# Patient Record
Sex: FEMALE | Race: WHITE | NOT HISPANIC OR LATINO | Employment: OTHER | URBAN - METROPOLITAN AREA
[De-identification: names, ages, dates, MRNs, and addresses within clinical notes are randomized per-mention and may not be internally consistent; named-entity substitution may affect disease eponyms.]

---

## 2017-01-04 ENCOUNTER — OFFICE VISIT (OUTPATIENT)
Dept: FAMILY MEDICINE | Facility: OTHER | Age: 67
End: 2017-01-04
Attending: NURSE PRACTITIONER
Payer: MEDICARE

## 2017-01-04 VITALS
SYSTOLIC BLOOD PRESSURE: 124 MMHG | RESPIRATION RATE: 14 BRPM | WEIGHT: 144 LBS | DIASTOLIC BLOOD PRESSURE: 74 MMHG | TEMPERATURE: 98 F | HEART RATE: 80 BPM | BODY MASS INDEX: 23.14 KG/M2 | HEIGHT: 66 IN

## 2017-01-04 DIAGNOSIS — Z00.00 ROUTINE GENERAL MEDICAL EXAMINATION AT A HEALTH CARE FACILITY: ICD-10-CM

## 2017-01-04 DIAGNOSIS — R10.811 RIGHT UPPER QUADRANT ABDOMINAL TENDERNESS, REBOUND TENDERNESS PRESENCE NOT SPECIFIED: ICD-10-CM

## 2017-01-04 DIAGNOSIS — E78.5 HYPERLIPIDEMIA WITH TARGET LDL LESS THAN 100: Primary | ICD-10-CM

## 2017-01-04 LAB
ALBUMIN SERPL-MCNC: 3.8 G/DL (ref 3.4–5)
ALP SERPL-CCNC: 67 U/L (ref 40–150)
ALT SERPL W P-5'-P-CCNC: 24 U/L (ref 0–50)
AMYLASE SERPL-CCNC: 86 U/L (ref 30–110)
AST SERPL W P-5'-P-CCNC: 18 U/L (ref 0–45)
BILIRUB DIRECT SERPL-MCNC: 0.1 MG/DL (ref 0–0.2)
BILIRUB SERPL-MCNC: 0.7 MG/DL (ref 0.2–1.3)
LIPASE SERPL-CCNC: 227 U/L (ref 73–393)
PROT SERPL-MCNC: 7.6 G/DL (ref 6.8–8.8)
TSH SERPL DL<=0.05 MIU/L-ACNC: 1.06 MU/L (ref 0.4–4)

## 2017-01-04 PROCEDURE — 80076 HEPATIC FUNCTION PANEL: CPT | Performed by: NURSE PRACTITIONER

## 2017-01-04 PROCEDURE — 82150 ASSAY OF AMYLASE: CPT | Performed by: NURSE PRACTITIONER

## 2017-01-04 PROCEDURE — 84443 ASSAY THYROID STIM HORMONE: CPT | Performed by: NURSE PRACTITIONER

## 2017-01-04 PROCEDURE — 83690 ASSAY OF LIPASE: CPT | Performed by: NURSE PRACTITIONER

## 2017-01-04 PROCEDURE — 36415 COLL VENOUS BLD VENIPUNCTURE: CPT | Performed by: NURSE PRACTITIONER

## 2017-01-04 PROCEDURE — 99397 PER PM REEVAL EST PAT 65+ YR: CPT | Performed by: NURSE PRACTITIONER

## 2017-01-04 PROCEDURE — G0202 SCR MAMMO BI INCL CAD: HCPCS | Mod: TC

## 2017-01-04 ASSESSMENT — ANXIETY QUESTIONNAIRES
2. NOT BEING ABLE TO STOP OR CONTROL WORRYING: NOT AT ALL
1. FEELING NERVOUS, ANXIOUS, OR ON EDGE: NOT AT ALL
GAD7 TOTAL SCORE: 0
3. WORRYING TOO MUCH ABOUT DIFFERENT THINGS: NOT AT ALL
6. BECOMING EASILY ANNOYED OR IRRITABLE: NOT AT ALL
IF YOU CHECKED OFF ANY PROBLEMS ON THIS QUESTIONNAIRE, HOW DIFFICULT HAVE THESE PROBLEMS MADE IT FOR YOU TO DO YOUR WORK, TAKE CARE OF THINGS AT HOME, OR GET ALONG WITH OTHER PEOPLE: NOT DIFFICULT AT ALL
7. FEELING AFRAID AS IF SOMETHING AWFUL MIGHT HAPPEN: NOT AT ALL
5. BEING SO RESTLESS THAT IT IS HARD TO SIT STILL: NOT AT ALL

## 2017-01-04 ASSESSMENT — PATIENT HEALTH QUESTIONNAIRE - PHQ9: 5. POOR APPETITE OR OVEREATING: NOT AT ALL

## 2017-01-04 NOTE — MR AVS SNAPSHOT
After Visit Summary   1/4/2017    Therese Medley    MRN: [id number]           Patient Information     Date Of Birth          1950        Visit Information        Provider Department      1/4/2017 9:00 AM Jo-Ann Fitch NP [facility]        Today's Diagnoses     Hyperlipidemia with target LDL less than 100    -  1     Routine general medical examination at a health care facility         Right upper quadrant abdominal tenderness, rebound tenderness presence not specified           Care Instructions        1. Hyperlipidemia with target LDL less than 100  - Low fat diet  - Fish oil 3 per day    2. Routine general medical examination at a health care facility  - TSH    3. Right upper quadrant abdominal tenderness, rebound tenderness presence not specified - with accompanying low back pain  - Amylase  - Lipase  - Hepatic panel      Try aleve as needed  FU if unimproved        Jo-Ann Fitch [facility]  [phone]              Follow-ups after your visit        Your next 10 appointments already scheduled     Jan 04, 2017 11:00 AM   MAMMOGRAM with [facility]   [facility] ([facility] )    [address] [id number] [phone]           Do not wear any body powder, lotions, deodorant or perfume the day of the exam. Bring a list of all medications, especially hormones.  If your last mammogram was not done at [place], please bring your mammogram films. We will need the name of your MD/PA to send a copy of your report.              Who to contact     If you have questions or need follow up information about today's clinic visit or your schedule please contact [facility] directly at [phone].  Normal or non-critical lab and imaging results will be communicated to you by MyChart, letter or phone within 4 business days after the clinic has received the results. If you do not hear from us within 7 days, please contact the clinic through  "[employer] or phone. If you have a critical or abnormal lab result, we will notify you by phone as soon as possible.  Submit refill requests through [employer] or call your pharmacy and they will forward the refill request to us. Please allow 3 business days for your refill to be completed.          Additional Information About Your Visit        [employer] Information     [employer] lets you send messages to your doctor, view your test results, renew your prescriptions, schedule appointments and more. To sign up, go to [email or web address] . Click on \"Log in\" on the left side of the screen, which will take you to the Welcome page. Then click on \"Sign up Now\" on the right side of the page.     You will be asked to enter the access code listed below, as well as some personal information. Please follow the directions to create your username and password.     Your access code is: [email or web address]  Expires: 2017  9:41 AM     Your access code will  in 90 days. If you need help or a new code, please call your [facility] clinic or [phone].        Care EveryWhere ID     This is your Care EveryWhere ID. This could be used by other organizations to access your [facility] medical records  [id number]        Your Vitals Were     Pulse Temperature Respirations Height BMI (Body Mass Index) Breastfeeding?    80 98  F (36.7  C) (Tympanic) 14 5' 6\" (1.676 m) 23.25 kg/m2 No       Blood Pressure from Last 3 Encounters:   17 124/74   11/04/15 120/78   10/21/14 124/80    Weight from Last 3 Encounters:   17 144 lb (65.318 kg)   11/04/15 149 lb (67.586 kg)   10/21/14 140 lb (63.504 kg)              We Performed the Following     Amylase     Hepatic panel     Lipase     TSH        Primary Care Provider Office Phone # Fax #    Jo-Ann Fitch [phone]       [address] [address]        Thank you!     Thank you for choosing [facility] (formerly [facility])  for your care. Our goal is always to " provide you with excellent care. Hearing back from our patients is one way we can continue to improve our services. Please take a few minutes to complete the written survey that you may receive in the mail after your visit with us. Thank you!             Your Updated Medication List - Protect others around you: Learn how to safely use, store and throw away your medicines at www.disposemymeds.org.      Notice  As of 1/4/2017  9:44 AM    You have not been prescribed any medications.

## 2017-01-04 NOTE — NURSING NOTE
"Chief Complaint   Patient presents with     Physical     Patient reports right side pain. Patient is fasting today.       Initial /74 mmHg  Pulse 80  Temp(Src) 98  F (36.7  C) (Tympanic)  Resp 14  Ht 5' 6\" (1.676 m)  Wt 144 lb (65.318 kg)  BMI 23.25 kg/m2  Breastfeeding? No Estimated body mass index is 23.25 kg/(m^2) as calculated from the following:    Height as of this encounter: 5' 6\" (1.676 m).    Weight as of this encounter: 144 lb (65.318 kg).  BP completed using cuff size: raymond Person      "

## 2017-01-04 NOTE — PATIENT INSTRUCTIONS
1. Hyperlipidemia with target LDL less than 100  - Low fat diet  - Fish oil 3 per day    2. Routine general medical examination at a health care facility  - TSH    3. Right upper quadrant abdominal tenderness, rebound tenderness presence not specified - with accompanying low back pain  - Amylase  - Lipase  - Hepatic panel      Try aleve as needed  FU if unimproved        [facility]  [phone]

## 2017-01-04 NOTE — PROGRESS NOTES
CC:  Yearly Well-Woman Exam  Chief Complaint   Patient presents with     Physical     Patient reports right side pain. Patient is fasting today.       HPI:  Therese Medley is a 66 year old female who present today for yearly exam.       Mammogram was today  Last Dexa scan - 2014.    Last colonoscopy - 2016.     BSE monthly  Dental and Eye examinations are UTD    Past Medical History   Diagnosis Date     Lumbago 5/25/2001     Sciatica 6/18/2001     Backache, unspecified 5/24/2001       Past Surgical History   Procedure Laterality Date     Conization of cervix       pre-cancer cells     Hysterectomy/complete, no cervix       large clots     Colonoscopy  2010     every 5 yrs     Gyn surgery       total hyst       No current outpatient prescriptions on file.     No current facility-administered medications for this visit.       Allergies   Allergen Reactions     Codeine Nausea and Vomiting       Family History   Problem Relation Age of Onset     Breast Cancer Sister      Breast Cancer Mother      Hypertension Mother      Thyroid Disease Mother      CANCER Son      hodgkins disease     CANCER Sister      lung     CANCER Father      throat     CEREBROVASCULAR DISEASE Father 73     cause of death     Cancer - colorectal Brother 56     cause of death     Asthma No family hx of      DIABETES No family hx of      Alcohol/Drug Father      Alcohol/Drug Brother      CANCER Mother      Cardiovascular Brother      Cardiovascular Brother      CANCER Brother      Cardiovascular Brother      Cardiovascular Brother        Social History     Social History     Marital Status:      Spouse Name: N/A     Number of Children: N/A     Years of Education: N/A     Social History Main Topics     Smoking status: Never Smoker      Smokeless tobacco: Never Used     Alcohol Use: Yes      Comment: occasionally     Drug Use: No     Sexual Activity:     Partners: Male     Other Topics Concern     Blood Transfusions Yes     Caffeine  "Concern Yes     coffee - 3 cups daily     Exercise Yes     walking 3-4 times/week     Parent/Sibling W/ Cabg, Mi Or Angioplasty Before 65f 55m? No     Social History Narrative       REVIEW OF SYSTEMS  Skin: negative  Eyes: negative  Ears/Nose/Throat: negative  Respiratory: No concerns  Breast:  Self examinations normal  Cardiovascular: negative  Gastrointestinal: negative  Genitourinary: negative  Musculoskeletal: Right side - muscular soreness, from low back wrapping around to upper abdomen  Neurologic: negative  Psychiatric: negative  Hematologic/Lymphatic/Immunologic: negative  Endocrine: negative      OBJECTIVE:  /74 mmHg  Pulse 80  Temp(Src) 98  F (36.7  C) (Tympanic)  Resp 14  Ht 5' 6\" (1.676 m)  Wt 144 lb (65.318 kg)  BMI 23.25 kg/m2  Breastfeeding? No  Constitutional: healthy, alert, no distress and cooperative  Head: Normocephalic. No masses, lesions, tenderness or abnormalities  Neck: Neck supple. No adenopathy. Thyroid symmetric, normal size,, Carotids without bruits.  ENT: ENT exam normal, no neck nodes or sinus tenderness  Cardiovascular: negative, PMI normal. No lifts, heaves, or thrills. RRR. No murmurs, clicks gallops or rub  Respiratory: negative, Percussion normal. Good diaphragmatic excursion. Lungs clear  Breast:  Examination normal  Gastrointestinal: Pain from back radiates to RUQ  Musculoskeletal: right sided low back - mid scapular line - tender muscles, anterior just under bottom rib, tender.    Skin: no suspicious lesions or rashes  Neurologic: Gait normal. Reflexes normal and symmetric. Sensation grossly WNL.  Psychiatric: mentation appears normal and affect normal/bright      1. Hyperlipidemia with target LDL less than 100  - Low fat diet  - Fish oil 3 per day    2. Routine general medical examination at a health care facility  - TSH    3. Right upper quadrant abdominal tenderness, rebound tenderness presence not specified - with accompanying low back pain  - Amylase  - " Lipase  - Hepatic panel      Try aleve as needed  FU if unimproved        [facility]  [phone]

## 2017-01-05 ASSESSMENT — ANXIETY QUESTIONNAIRES: GAD7 TOTAL SCORE: 0

## 2017-01-05 ASSESSMENT — PATIENT HEALTH QUESTIONNAIRE - PHQ9: SUM OF ALL RESPONSES TO PHQ QUESTIONS 1-9: 0

## 2018-01-05 ENCOUNTER — OFFICE VISIT (OUTPATIENT)
Dept: FAMILY MEDICINE | Facility: OTHER | Age: 68
End: 2018-01-05
Attending: NURSE PRACTITIONER
Payer: COMMERCIAL

## 2018-01-05 VITALS
SYSTOLIC BLOOD PRESSURE: 118 MMHG | DIASTOLIC BLOOD PRESSURE: 76 MMHG | RESPIRATION RATE: 14 BRPM | WEIGHT: 147 LBS | HEIGHT: 65 IN | HEART RATE: 80 BPM | BODY MASS INDEX: 24.49 KG/M2

## 2018-01-05 DIAGNOSIS — Z23 NEED FOR PROPHYLACTIC VACCINATION AND INOCULATION AGAINST COMBINATIONS OF DISEASE: ICD-10-CM

## 2018-01-05 DIAGNOSIS — Z11.59 NEED FOR HEPATITIS C SCREENING TEST: ICD-10-CM

## 2018-01-05 DIAGNOSIS — Z00.00 ROUTINE GENERAL MEDICAL EXAMINATION AT A HEALTH CARE FACILITY: Primary | ICD-10-CM

## 2018-01-05 DIAGNOSIS — E78.5 HYPERLIPIDEMIA LDL GOAL <100: ICD-10-CM

## 2018-01-05 LAB
ALBUMIN UR-MCNC: NEGATIVE MG/DL
ANION GAP SERPL CALCULATED.3IONS-SCNC: 6 MMOL/L (ref 3–14)
APPEARANCE UR: CLEAR
BILIRUB UR QL STRIP: NEGATIVE
BUN SERPL-MCNC: 18 MG/DL (ref 7–30)
CALCIUM SERPL-MCNC: 9 MG/DL (ref 8.5–10.1)
CHLORIDE SERPL-SCNC: 104 MMOL/L (ref 94–109)
CHOLEST SERPL-MCNC: 255 MG/DL
CO2 SERPL-SCNC: 29 MMOL/L (ref 20–32)
COLOR UR AUTO: YELLOW
CREAT SERPL-MCNC: 0.87 MG/DL (ref 0.52–1.04)
GFR SERPL CREATININE-BSD FRML MDRD: 65 ML/MIN/1.7M2
GLUCOSE SERPL-MCNC: 83 MG/DL (ref 70–99)
GLUCOSE UR STRIP-MCNC: NEGATIVE MG/DL
HDLC SERPL-MCNC: 70 MG/DL
HGB UR QL STRIP: ABNORMAL
KETONES UR STRIP-MCNC: NEGATIVE MG/DL
LDLC SERPL CALC-MCNC: 156 MG/DL
LEUKOCYTE ESTERASE UR QL STRIP: NEGATIVE
NITRATE UR QL: NEGATIVE
NONHDLC SERPL-MCNC: 185 MG/DL
PH UR STRIP: 5.5 PH (ref 5–7)
POTASSIUM SERPL-SCNC: 4.2 MMOL/L (ref 3.4–5.3)
RBC #/AREA URNS AUTO: NORMAL /HPF
SODIUM SERPL-SCNC: 139 MMOL/L (ref 133–144)
SOURCE: ABNORMAL
SP GR UR STRIP: 1.02 (ref 1–1.03)
TRIGL SERPL-MCNC: 143 MG/DL
UROBILINOGEN UR STRIP-ACNC: 0.2 EU/DL (ref 0.2–1)
WBC #/AREA URNS AUTO: NORMAL /HPF

## 2018-01-05 PROCEDURE — 99397 PER PM REEVAL EST PAT 65+ YR: CPT | Performed by: NURSE PRACTITIONER

## 2018-01-05 PROCEDURE — 99000 SPECIMEN HANDLING OFFICE-LAB: CPT | Performed by: NURSE PRACTITIONER

## 2018-01-05 PROCEDURE — 80048 BASIC METABOLIC PNL TOTAL CA: CPT | Mod: ZL | Performed by: NURSE PRACTITIONER

## 2018-01-05 PROCEDURE — 86803 HEPATITIS C AB TEST: CPT | Mod: ZL | Performed by: NURSE PRACTITIONER

## 2018-01-05 PROCEDURE — 81001 URINALYSIS AUTO W/SCOPE: CPT | Mod: ZL | Performed by: NURSE PRACTITIONER

## 2018-01-05 PROCEDURE — 90670 PCV13 VACCINE IM: CPT | Performed by: NURSE PRACTITIONER

## 2018-01-05 PROCEDURE — G0463 HOSPITAL OUTPT CLINIC VISIT: HCPCS

## 2018-01-05 PROCEDURE — 36415 COLL VENOUS BLD VENIPUNCTURE: CPT | Mod: ZL | Performed by: NURSE PRACTITIONER

## 2018-01-05 PROCEDURE — 80061 LIPID PANEL: CPT | Mod: ZL | Performed by: NURSE PRACTITIONER

## 2018-01-05 PROCEDURE — 90471 IMMUNIZATION ADMIN: CPT | Performed by: NURSE PRACTITIONER

## 2018-01-05 ASSESSMENT — ANXIETY QUESTIONNAIRES
GAD7 TOTAL SCORE: 0
4. TROUBLE RELAXING: NOT AT ALL
5. BEING SO RESTLESS THAT IT IS HARD TO SIT STILL: NOT AT ALL
IF YOU CHECKED OFF ANY PROBLEMS ON THIS QUESTIONNAIRE, HOW DIFFICULT HAVE THESE PROBLEMS MADE IT FOR YOU TO DO YOUR WORK, TAKE CARE OF THINGS AT HOME, OR GET ALONG WITH OTHER PEOPLE: NOT DIFFICULT AT ALL
2. NOT BEING ABLE TO STOP OR CONTROL WORRYING: NOT AT ALL
7. FEELING AFRAID AS IF SOMETHING AWFUL MIGHT HAPPEN: NOT AT ALL
6. BECOMING EASILY ANNOYED OR IRRITABLE: NOT AT ALL
1. FEELING NERVOUS, ANXIOUS, OR ON EDGE: NOT AT ALL
3. WORRYING TOO MUCH ABOUT DIFFERENT THINGS: NOT AT ALL

## 2018-01-05 ASSESSMENT — PATIENT HEALTH QUESTIONNAIRE - PHQ9: SUM OF ALL RESPONSES TO PHQ QUESTIONS 1-9: 0

## 2018-01-05 NOTE — NURSING NOTE
"Estimated body mass index is 24.46 kg/(m^2) as calculated from the following:    Height as of this encounter: 5' 5\" (1.651 m).    Weight as of this encounter: 147 lb (66.7 kg).  BP Readings from Last 1 Encounters:   01/05/18 118/80   ]  BP cuff size:  regular  Do you feel safe in your environment?  Yes  Does the patient need any medication refills today? No    Magaly Gusman      "

## 2018-01-05 NOTE — PROGRESS NOTES
SUBJECTIVE:   CC: Therese Medley is an 67 year old woman who presents for preventive health visit.         Healthy Habits:    Do you get at least three servings of calcium containing foods daily (dairy, green leafy vegetables, etc.)? yes    Amount of exercise or daily activities, outside of work: 4 days a week, 45-60min daily     Problems taking medications regularly No    Medication side effects: No    Have you had an eye exam in the past two years? yes    Do you see a dentist twice per year? yes    Do you have sleep apnea, excessive snoring or daytime drowsiness?no        Right thumb  1 week  Grating cheese and scraped thumb      PHQ-9 SCORE 11/4/2015 1/4/2017 1/5/2018   Total Score - - -   Total Score 0 0 0     MELANY-7 SCORE 1/4/2017 1/5/2018   Total Score 0 0         Abuse: Current or Past(Physical, Sexual or Emotional)- No  Do you feel safe in your environment - Yes    Social History   Substance Use Topics     Smoking status: Never Smoker     Smokeless tobacco: Never Used     Alcohol use Yes      Comment: occasionally     If you drink alcohol do you typically have >3 drinks per day or >7 drinks per week? No                     Reviewed orders with patient.  Reviewed health maintenance and updated orders accordingly - Yes  Labs reviewed in EPIC  BP Readings from Last 3 Encounters:   01/05/18 118/76   01/04/17 124/74   11/04/15 120/78    Wt Readings from Last 3 Encounters:   01/05/18 147 lb (66.7 kg)   01/04/17 144 lb (65.3 kg)   11/04/15 149 lb (67.6 kg)                  Patient Active Problem List   Diagnosis     Advanced care planning/counseling discussion     Hyperlipidemia with target LDL less than 100     Past Surgical History:   Procedure Laterality Date     COLONOSCOPY  2010    every 5 yrs     conization of cervix      pre-cancer cells     GYN SURGERY      total hyst     hysterectomy/complete, no cervix      large clots       Social History   Substance Use Topics     Smoking status: Never Smoker      Smokeless tobacco: Never Used     Alcohol use Yes      Comment: occasionally     Family History   Problem Relation Age of Onset     CANCER Father      throat     CEREBROVASCULAR DISEASE Father 73     cause of death     Alcohol/Drug Father      Breast Cancer Sister      Breast Cancer Mother      Hypertension Mother      Thyroid Disease Mother      CANCER Mother      CANCER Son      hodgkins disease     CANCER Sister      lung     Cancer - colorectal Brother 56     cause of death     Alcohol/Drug Brother      Cardiovascular Brother      Cardiovascular Brother      CANCER Brother      Cardiovascular Brother      Cardiovascular Brother      Asthma No family hx of      DIABETES No family hx of          Current Outpatient Prescriptions   Medication Sig Dispense Refill     aspirin 81 MG tablet Take by mouth daily       Allergies   Allergen Reactions     Codeine Nausea and Vomiting     Recent Labs   Lab Test  01/04/17   0946  11/04/15   0954  10/21/14   1119   LDL   --   133*  136*   HDL   --   61  67   TRIG   --   111  163*   ALT  24   --    --    TSH  1.06   --   1.38          Mammo is scheduled for next week    History of abnormal Pap smear:  No    Reviewed and updated as needed this visit by clinical staffTobacco  Allergies  Meds  Med Hx  Surg Hx  Fam Hx  Soc Hx        Reviewed and updated as needed this visit by Provider  Tobacco        Past Medical History:   Diagnosis Date     Backache, unspecified 5/24/2001     Lumbago 5/25/2001     Sciatica 6/18/2001      Past Surgical History:   Procedure Laterality Date     COLONOSCOPY  2010    every 5 yrs     conization of cervix      pre-cancer cells     GYN SURGERY      total hyst     hysterectomy/complete, no cervix      large clots       ROS:  C: NEGATIVE for fever, chills, change in weight  INTEGUMENTARY/SKIN: right thumb abrasion  E: NEGATIVE for vision changes or irritation  ENT: NEGATIVE for ear, mouth and throat problems  R: NEGATIVE for significant cough or  "SOB  B: NEGATIVE for masses, tenderness or discharge  CV: NEGATIVE for chest pain, palpitations or peripheral edema  GI: NEGATIVE for nausea, abdominal pain, heartburn, or change in bowel habits  : NEGATIVE for unusual urinary or vaginal symptoms. No vaginal bleeding.  N: NEGATIVE for weakness, dizziness or paresthesias  P: NEGATIVE for changes in mood or affect     OBJECTIVE:   /76 (BP Location: Left arm, Patient Position: Sitting, Cuff Size: Adult Regular)  Pulse 80  Resp 14  Ht 5' 5\" (1.651 m)  Wt 147 lb (66.7 kg)  BMI 24.46 kg/m2       EXAM:  GENERAL: healthy, alert and no distress  EYES: Eyes grossly normal to inspection, PERRL and conjunctivae and sclerae normal  HENT: ear canals and TM's normal, nose and mouth without ulcers or lesions  NECK: no adenopathy, no asymmetry, masses, or scars and thyroid normal to palpation  RESP: lungs clear to auscultation - no rales, rhonchi or wheezes  BREAST: normal without masses, tenderness or nipple discharge and no palpable axillary masses or adenopathy  CV: regular rate and rhythm, normal S1 S2, no S3 or S4, no murmur, click or rub, no peripheral edema and peripheral pulses strong  ABDOMEN: soft, nontender, no hepatosplenomegaly, no masses and bowel sounds normal  MS: no gross musculoskeletal defects noted, no edema  SKIN: healing abrasion right thumb, IP joint, non- concerning  PSYCH: mentation appears normal, affect normal/bright      ASSESSMENT/PLAN:   1. Routine general medical examination at a health care facility  - [facility]    2. Hyperlipidemia LDL goal <100  - Low fat diet  - Fish oil 3 per day  - Lipid Profile  - Basic metabolic panel    3. Need for hepatitis C screening test  - Hepatitis C Screen Reflex to HCV RNA Quant and Genotype    4. Need for prophylactic vaccination and inoculation against combinations of disease  - PNEUMOCOCCAL CONJ VACCINE 13 VALENT IM  - ADMIN 1st VACCINE        COUNSELING: " "  Reviewed preventive health counseling, as reflected in patient instructions       Regular exercise       Healthy diet/nutrition       Vision screening         reports that she has never smoked. She has never used smokeless tobacco.    Estimated body mass index is 24.46 kg/(m^2) as calculated from the following:    Height as of this encounter: 5' 5\" (1.651 m).    Weight as of this encounter: 147 lb (66.7 kg).         Counseling Resources:  ATP IV Guidelines  Pooled Cohorts Equation Calculator  Breast Cancer Risk Calculator  FRAX Risk Assessment  ICSI Preventive Guidelines  Dietary Guidelines for Americans, 2010  USDA's MyPlate  ASA Prophylaxis  Lung CA Screening    Jo-Ann Fitch NP  [facility]  "

## 2018-01-05 NOTE — PATIENT INSTRUCTIONS
ASSESSMENT/PLAN:   1. Routine general medical examination at a health care facility  - UA reflex to Microscopic and Culture - [facility]/[place]    2. Hyperlipidemia LDL goal <100  - Low fat diet  - Fish oil 3 per day  - Lipid Profile  - Basic metabolic panel    3. Need for hepatitis C screening test  - Hepatitis C Screen Reflex to HCV RNA Quant and Genotype    4. Need for prophylactic vaccination and inoculation against combinations of disease  - PNEUMOCOCCAL CONJ VACCINE 13 VALENT IM  - ADMIN 1st VACCINE      Preventive Health Recommendations  Female Ages 65 +    Yearly exam:     See your health care provider every year in order to  o Review health changes.   o Discuss preventive care.    o Review your medicines if your doctor has prescribed any.      You no longer need a yearly Pap test unless you've had an abnormal Pap test in the past 10 years. If you have vaginal symptoms, such as bleeding or discharge, be sure to talk with your provider about a Pap test.      Every 1 to 2 years, have a mammogram.  If you are over 69, talk with your health care provider about whether or not you want to continue having screening mammograms.      Every 10 years, have a colonoscopy. Or, have a yearly FIT test (stool test). These exams will check for colon cancer.       Have a cholesterol test every 5 years, or more often if your doctor advises it.       Have a diabetes test (fasting glucose) every three years. If you are at risk for diabetes, you should have this test more often.       At age 65, have a bone density scan (DEXA) to check for osteoporosis (brittle bone disease).    Shots:    Get a flu shot each year.    Get a tetanus shot every 10 years.    Talk to your doctor about your pneumonia vaccines. There are now two you should receive - Pneumovax (PPSV 23) and Prevnar (PCV 13).    Talk to your doctor about the shingles vaccine.    Talk to your doctor about the hepatitis B vaccine.    Nutrition:     Eat at least 5 servings  of fruits and vegetables each day.      Eat whole-grain bread, whole-wheat pasta and brown rice instead of white grains and rice.      Talk to your provider about Calcium and Vitamin D.     Lifestyle    Exercise at least 150 minutes a week (30 minutes a day, 5 days a week). This will help you control your weight and prevent disease.      Limit alcohol to one drink per day.      No smoking.       Wear sunscreen to prevent skin cancer.       See your dentist twice a year for an exam and cleaning.      See your eye doctor every 1 to 2 years to screen for conditions such as glaucoma, macular degeneration, cataracts, etc

## 2018-01-05 NOTE — MR AVS SNAPSHOT
After Visit Summary   1/5/2018    Therese Medley    MRN: [id number]           Patient Information     Date Of Birth          1950        Visit Information        Provider Department      1/5/2018 9:00 AM Jo-Ann Fitch NP [facility]        Today's Diagnoses     Routine general medical examination at a health care facility    -  1    Hyperlipidemia LDL goal <100        Need for hepatitis C screening test        Need for prophylactic vaccination and inoculation against combinations of disease          Care Instructions      ASSESSMENT/PLAN:   1. Routine general medical examination at a health care facility  - UA reflex to Microscopic and Culture - [facility]    2. Hyperlipidemia LDL goal <100  - Low fat diet  - Fish oil 3 per day  - Lipid Profile  - Basic metabolic panel    3. Need for hepatitis C screening test  - Hepatitis C Screen Reflex to HCV RNA Quant and Genotype    4. Need for prophylactic vaccination and inoculation against combinations of disease  - PNEUMOCOCCAL CONJ VACCINE 13 VALENT IM  - ADMIN 1st VACCINE      Preventive Health Recommendations  Female Ages 65 +    Yearly exam:     See your health care provider every year in order to  o Review health changes.   o Discuss preventive care.    o Review your medicines if your doctor has prescribed any.      You no longer need a yearly Pap test unless you've had an abnormal Pap test in the past 10 years. If you have vaginal symptoms, such as bleeding or discharge, be sure to talk with your provider about a Pap test.      Every 1 to 2 years, have a mammogram.  If you are over 69, talk with your health care provider about whether or not you want to continue having screening mammograms.      Every 10 years, have a colonoscopy. Or, have a yearly FIT test (stool test). These exams will check for colon cancer.       Have a cholesterol test every 5 years, or more often if your doctor advises it.       Have a diabetes test  (fasting glucose) every three years. If you are at risk for diabetes, you should have this test more often.       At age 65, have a bone density scan (DEXA) to check for osteoporosis (brittle bone disease).    Shots:    Get a flu shot each year.    Get a tetanus shot every 10 years.    Talk to your doctor about your pneumonia vaccines. There are now two you should receive - Pneumovax (PPSV 23) and Prevnar (PCV 13).    Talk to your doctor about the shingles vaccine.    Talk to your doctor about the hepatitis B vaccine.    Nutrition:     Eat at least 5 servings of fruits and vegetables each day.      Eat whole-grain bread, whole-wheat pasta and brown rice instead of white grains and rice.      Talk to your provider about Calcium and Vitamin D.     Lifestyle    Exercise at least 150 minutes a week (30 minutes a day, 5 days a week). This will help you control your weight and prevent disease.      Limit alcohol to one drink per day.      No smoking.       Wear sunscreen to prevent skin cancer.       See your dentist twice a year for an exam and cleaning.      See your eye doctor every 1 to 2 years to screen for conditions such as glaucoma, macular degeneration, cataracts, etc           Follow-ups after your visit        Your next 10 appointments already scheduled     Jan 17, 2018  9:30 AM CST   (Arrive by 9:15 AM)   MA SCREENING DIGITAL BILATERAL with MTMA1   [facility] ([facility] )    [address] [address]   [phone]           Do not use any powder, lotion or deodorant under your arms or on your breast. If you do, we will ask you to remove it before your exam.  Wear comfortable, two-piece clothing.  If you have any allergies, tell your care team.  Bring any previous mammograms from other facilities or have them mailed to the breast center.              Who to contact     If you have questions or need follow up information about today's clinic  "visit or your schedule please contact [facility] directly at [phone].  Normal or non-critical lab and imaging results will be communicated to you by MyChart, letter or phone within 4 business days after the clinic has received the results. If you do not hear from us within 7 days, please contact the clinic through broadbandchoiceshart or phone. If you have a critical or abnormal lab result, we will notify you by phone as soon as possible.  Submit refill requests through [employer] or call your pharmacy and they will forward the refill request to us. Please allow 3 business days for your refill to be completed.          Additional Information About Your Visit        MyChart Information     [employer] lets you send messages to your doctor, view your test results, renew your prescriptions, schedule appointments and more. To sign up, go to [email or web address] . Click on \"Log in\" on the left side of the screen, which will take you to the Welcome page. Then click on \"Sign up Now\" on the right side of the page.     You will be asked to enter the access code listed below, as well as some personal information. Please follow the directions to create your username and password.     Your access code is: [email or web address]  Expires: 2018 10:02 AM     Your access code will  in 90 days. If you need help or a new code, please call your [facility] clinic or [phone].        Care EveryWhere ID     This is your Care EveryWhere ID. This could be used by other organizations to access your [facility] medical records  [id number]        Your Vitals Were     Pulse Respirations Height BMI (Body Mass Index)          80 14 5' 5\" (1.651 m) 24.46 kg/m2         Blood Pressure from Last 3 Encounters:   18 118/76   17 124/74   11/04/15 120/78    Weight from Last 3 Encounters:   18 147 lb (66.7 kg)   17 144 lb (65.3 kg)   11/04/15 149 lb (67.6 kg)              We Performed the Following     *UA reflex to Microscopic " and Culture - [facility]/[place]     ADMIN 1st VACCINE     Basic metabolic panel     Hepatitis C Screen Reflex to HCV RNA Quant and Genotype     Lipid Profile     PNEUMOCOCCAL CONJ VACCINE 13 VALENT IM        Primary Care Provider Office Phone # Fax #    Jo-Ann Fitch [phone] [address]  [place] [address]        Equal Access to Services     CARLOS ELIZONDO : Hadii aad ku hadasho Soomaali, waaxda luqadaha, qaybta kaalmada adeegyada, waxay idiin hayaan adeeg kharash laNaeemaan . So [facility] [phone].    ATENCIÓN: Si habla español, tiene a lopez disposición servicios gratuitos de asistencia lingüística. Llame al [phone].    We comply with applicable federal civil rights laws and [place] laws. We do not discriminate on the basis of race, color, national origin, age, disability, sex, sexual orientation, or gender identity.            Thank you!     Thank you for choosing [facility]  for your care. Our goal is always to provide you with excellent care. Hearing back from our patients is one way we can continue to improve our services. Please take a few minutes to complete the written survey that you may receive in the mail after your visit with us. Thank you!             Your Updated Medication List - Protect others around you: Learn how to safely use, store and throw away your medicines at www.disposemymeds.org.          This list is accurate as of: 1/5/18 10:02 AM.  Always use your most recent med list.                   Brand Name Dispense Instructions for use Diagnosis    aspirin 81 MG tablet      Take by mouth daily

## 2018-01-06 ENCOUNTER — HEALTH MAINTENANCE LETTER (OUTPATIENT)
Age: 68
End: 2018-01-06

## 2018-01-06 ASSESSMENT — ANXIETY QUESTIONNAIRES: GAD7 TOTAL SCORE: 0

## 2018-01-08 LAB — HCV AB SERPL QL IA: NONREACTIVE

## 2018-01-17 ENCOUNTER — RADIANT APPOINTMENT (OUTPATIENT)
Dept: MAMMOGRAPHY | Facility: OTHER | Age: 68
End: 2018-01-17
Attending: NURSE PRACTITIONER
Payer: MEDICARE

## 2018-01-17 DIAGNOSIS — Z12.31 VISIT FOR SCREENING MAMMOGRAM: ICD-10-CM

## 2018-01-17 PROCEDURE — 77067 SCR MAMMO BI INCL CAD: CPT | Mod: TC

## 2018-02-26 ENCOUNTER — OFFICE VISIT (OUTPATIENT)
Dept: FAMILY MEDICINE | Facility: OTHER | Age: 68
End: 2018-02-26
Attending: NURSE PRACTITIONER
Payer: MEDICARE

## 2018-02-26 VITALS
RESPIRATION RATE: 14 BRPM | WEIGHT: 149 LBS | HEART RATE: 89 BPM | SYSTOLIC BLOOD PRESSURE: 118 MMHG | BODY MASS INDEX: 23.95 KG/M2 | DIASTOLIC BLOOD PRESSURE: 72 MMHG | OXYGEN SATURATION: 96 % | TEMPERATURE: 99.9 F | HEIGHT: 66 IN

## 2018-02-26 DIAGNOSIS — R05.9 COUGH: Primary | ICD-10-CM

## 2018-02-26 DIAGNOSIS — J10.1 INFLUENZA B: ICD-10-CM

## 2018-02-26 LAB
FLUAV+FLUBV AG SPEC QL: NEGATIVE
FLUAV+FLUBV AG SPEC QL: POSITIVE
SPECIMEN SOURCE: ABNORMAL

## 2018-02-26 PROCEDURE — G0463 HOSPITAL OUTPT CLINIC VISIT: HCPCS

## 2018-02-26 PROCEDURE — 87804 INFLUENZA ASSAY W/OPTIC: CPT | Mod: ZL,59 | Performed by: NURSE PRACTITIONER

## 2018-02-26 PROCEDURE — 99213 OFFICE O/P EST LOW 20 MIN: CPT | Performed by: NURSE PRACTITIONER

## 2018-02-26 RX ORDER — OSELTAMIVIR PHOSPHATE 75 MG/1
75 CAPSULE ORAL DAILY
Qty: 10 CAPSULE | Refills: 0 | Status: SHIPPED | OUTPATIENT
Start: 2018-02-26 | End: 2018-03-08

## 2018-02-26 ASSESSMENT — ANXIETY QUESTIONNAIRES
3. WORRYING TOO MUCH ABOUT DIFFERENT THINGS: NOT AT ALL
GAD7 TOTAL SCORE: 0
4. TROUBLE RELAXING: NOT AT ALL
1. FEELING NERVOUS, ANXIOUS, OR ON EDGE: NOT AT ALL
2. NOT BEING ABLE TO STOP OR CONTROL WORRYING: NOT AT ALL
6. BECOMING EASILY ANNOYED OR IRRITABLE: NOT AT ALL
5. BEING SO RESTLESS THAT IT IS HARD TO SIT STILL: NOT AT ALL
7. FEELING AFRAID AS IF SOMETHING AWFUL MIGHT HAPPEN: NOT AT ALL

## 2018-02-26 ASSESSMENT — PAIN SCALES - GENERAL: PAINLEVEL: NO PAIN (0)

## 2018-02-26 NOTE — NURSING NOTE
"Chief Complaint   Patient presents with     URI       Initial /72 (BP Location: Left arm, Patient Position: Chair, Cuff Size: Adult Regular)  Pulse 89  Temp 99.9  F (37.7  C) (Tympanic)  Resp 14  Ht 5' 5.5\" (1.664 m)  Wt 149 lb (67.6 kg)  SpO2 96%  BMI 24.42 kg/m2 Estimated body mass index is 24.42 kg/(m^2) as calculated from the following:    Height as of this encounter: 5' 5.5\" (1.664 m).    Weight as of this encounter: 149 lb (67.6 kg).  Medication Reconciliation: complete     Eileen Augustine     "

## 2018-02-26 NOTE — PROGRESS NOTES
SUBJECTIVE:   Therese Medley is a 67 year old female who presents to clinic today for the following health issues:        Acute Illness   Acute illness concerns: chest cold  Onset: 5 days    Fever: YES, low grade    Chills/Sweats: YES    Headache (location?): no     Sinus Pressure:no    Conjunctivitis:  no    Ear Pain: no    Rhinorrhea: no    Congestion: YES    Sore Throat: no      Cough: YES-productive of clear sputum    Wheeze: no     Decreased Appetite: not really    Nausea: no     Vomiting: no     Diarrhea:  no     Dysuria/Freq.: no, some increased frequency    Fatigue/Achiness: YES    Sick/Strep Exposure: YES     Therapies Tried and outcome: robitussin, tylenol head and cold          Problem list and histories reviewed & adjusted, as indicated.  Additional history: as documented    Patient Active Problem List   Diagnosis     Advanced care planning/counseling discussion     Past Surgical History:   Procedure Laterality Date     COLONOSCOPY  2010    every 5 yrs     conization of cervix      pre-cancer cells     GYN SURGERY      total hyst     hysterectomy/complete, no cervix      large clots       Social History   Substance Use Topics     Smoking status: Never Smoker     Smokeless tobacco: Never Used     Alcohol use Yes      Comment: occasionally     Family History   Problem Relation Age of Onset     CANCER Father      throat     CEREBROVASCULAR DISEASE Father 73     cause of death     Alcohol/Drug Father      Breast Cancer Sister      Breast Cancer Mother      Hypertension Mother      Thyroid Disease Mother      CANCER Mother      CANCER Son      hodgkins disease     CANCER Sister      lung     Cancer - colorectal Brother 56     cause of death     Alcohol/Drug Brother      Cardiovascular Brother      Cardiovascular Brother      CANCER Brother      Cardiovascular Brother      Cardiovascular Brother      Asthma No family hx of      DIABETES No family hx of          Current Outpatient Prescriptions  "  Medication Sig Dispense Refill     aspirin 81 MG tablet Take by mouth daily       Allergies   Allergen Reactions     Codeine Nausea and Vomiting     Recent Labs   Lab Test  01/05/18   1008  01/04/17   0946  11/04/15   0954  10/21/14   1119   LDL  156*   --   133*  136*   HDL  70   --   61  67   TRIG  143   --   111  163*   ALT   --   24   --    --    CR  0.87   --    --    --    GFRESTIMATED  65   --    --    --    GFRESTBLACK  78   --    --    --    POTASSIUM  4.2   --    --    --    TSH   --   1.06   --   1.38      BP Readings from Last 3 Encounters:   02/26/18 118/72   01/05/18 118/76   01/04/17 124/74    Wt Readings from Last 3 Encounters:   02/26/18 149 lb (67.6 kg)   01/05/18 147 lb (66.7 kg)   01/04/17 144 lb (65.3 kg)                  Labs reviewed in EPIC    Reviewed and updated as needed this visit by clinical staff       Reviewed and updated as needed this visit by Provider         ROS:  Constitutional, HEENT, cardiovascular, pulmonary, gi and gu systems are negative, except as otherwise noted.    OBJECTIVE:     /72 (BP Location: Left arm, Patient Position: Chair, Cuff Size: Adult Regular)  Pulse 89  Temp 99.9  F (37.7  C) (Tympanic)  Resp 14  Ht 5' 5.5\" (1.664 m)  Wt 149 lb (67.6 kg)  SpO2 96%  BMI 24.42 kg/m2  Body mass index is 24.42 kg/(m^2).       GENERAL: healthy, alert and no distress  HENT: ear canals and TM's normal, nose and mouth without ulcers or lesions  NECK: no adenopathy, no asymmetry, masses, or scars and thyroid normal to palpation  RESP: lungs clear to auscultation - no rales, rhonchi or wheezes  CV: regular rate and rhythm, normal S1 S2, no S3 or S4, no murmur, click or rub, no peripheral edema and peripheral pulses strong  SKIN: no suspicious lesions or rashes  PSYCH: mentation appears normal, affect normal/bright      Diagnostic Test Results:  Results for orders placed or performed in visit on 02/26/18 (from the past 24 hour(s))   Influenza A/B antigen   Result Value " Ref Range    Influenza A/B Agn Specimen Nasal     Influenza A Negative NEG^Negative    Influenza B Positive (A) NEG^Negative       ASSESSMENT/PLAN:     1. Influenza B  - oseltamivir (TAMIFLU) 75 MG capsule; Take 1 capsule (75 mg) by mouth daily for 10 days  Dispense: 10 capsule; Refill: 0    2. Cough  - OTC cough and cold          Fluids  Rest  Humidity at home - add bacteriostatic solution to humidifier  OTC Mucinex liquid cough and cold  Add Zicam or other zinc daily until you feel better  Please go to the ER or UC if your symptoms worsen   Please return to clinic if unimproved        Jo-Ann Fitch NP  [facility]

## 2018-02-26 NOTE — PATIENT INSTRUCTIONS
Results for orders placed or performed in visit on 02/26/18 (from the past 24 hour(s))   Influenza A/B antigen   Result Value Ref Range    Influenza A/B Agn Specimen Nasal     Influenza A Negative NEG^Negative    Influenza B Positive (A) NEG^Negative         ASSESSMENT/PLAN:     1. Influenza B  - oseltamivir (TAMIFLU) 75 MG capsule; Take 1 capsule (75 mg) by mouth daily for 10 days  Dispense: 10 capsule; Refill: 0    2. Cough  - OTC cough and cold          Fluids  Rest  Humidity at home - add bacteriostatic solution to humidifier  OTC Mucinex liquid cough and cold  Add Zicam or other zinc daily until you feel better  Please go to the ER or UC if your symptoms worsen   Please return to clinic if unimproved        Jo-Ann Fitch NP  [facility]

## 2018-02-26 NOTE — MR AVS SNAPSHOT
After Visit Summary   2/26/2018    Therese Medley    MRN: [id number]           Patient Information     Date Of Birth          1950        Visit Information        Provider Department      2/26/2018 2:30 PM Jo-Ann Fitch NP [facility]        Today's Diagnoses     Cough    -  1    Influenza B          Care Instructions    Results for orders placed or performed in visit on 02/26/18 (from the past 24 hour(s))   Influenza A/B antigen   Result Value Ref Range    Influenza A/B Agn Specimen Nasal     Influenza A Negative NEG^Negative    Influenza B Positive (A) NEG^Negative         ASSESSMENT/PLAN:     1. Influenza B  - oseltamivir (TAMIFLU) 75 MG capsule; Take 1 capsule (75 mg) by mouth daily for 10 days  Dispense: 10 capsule; Refill: 0    2. Cough  - OTC cough and cold          Fluids  Rest  Humidity at home - add bacteriostatic solution to humidifier  OTC Mucinex liquid cough and cold  Add Zicam or other zinc daily until you feel better  Please go to the ER or UC if your symptoms worsen   Please return to clinic if unimproved        Jo-Ann Fitch NP  [facility]            Follow-ups after your visit        Who to contact     If you have questions or need follow up information about today's clinic visit or your schedule please contact [facility] directly at [phone].  Normal or non-critical lab and imaging results will be communicated to you by MyChart, letter or phone within 4 business days after the clinic has received the results. If you do not hear from us within 7 days, please contact the clinic through MyChart or phone. If you have a critical or abnormal lab result, we will notify you by phone as soon as possible.  Submit refill requests through [employer] or call your pharmacy and they will forward the refill request to us. Please allow 3 business days for your refill to be completed.          Additional Information About Your Visit        "[employer]  "Information     [employer] lets you send messages to your doctor, view your test results, renew your prescriptions, schedule appointments and more. To sign up, go to [email or web address] . Click on \"Log in\" on the left side of the screen, which will take you to the Welcome page. Then click on \"Sign up Now\" on the right side of the page.     You will be asked to enter the access code listed below, as well as some personal information. Please follow the directions to create your username and password.     Your access code is: [email or web address]  Expires: 2018 10:02 AM     Your access code will  in 90 days. If you need help or a new code, please call your [facility] clinic or [phone].        Care EveryWhere ID     This is your Care EveryWhere ID. This could be used by other organizations to access your [facility] medical records  [id number]        Your Vitals Were     Pulse Temperature Respirations Height Pulse Oximetry BMI (Body Mass Index)    89 99.9  F (37.7  C) (Tympanic) 14 5' 5.5\" (1.664 m) 96% 24.42 kg/m2       Blood Pressure from Last 3 Encounters:   18 118/72   18 118/76   17 124/74    Weight from Last 3 Encounters:   18 149 lb (67.6 kg)   18 147 lb (66.7 kg)   17 144 lb (65.3 kg)              We Performed the Following     Influenza A/B antigen          Today's Medication Changes          These changes are accurate as of 18  3:03 PM.  If you have any questions, ask your nurse or doctor.               Start taking these medicines.        Dose/Directions    oseltamivir 75 MG capsule   Commonly known as:  TAMIFLU   Used for:  Influenza B   Started by:  Jo-Ann Fitch NP        Dose:  75 mg   Take 1 capsule (75 mg) by mouth daily for 10 days   Quantity:  10 capsule   Refills:  0            Where to get your medicines      These medications were sent to [facility] - [place], [place] - [address]  [address] [address]     Phone:  [phone]     oseltamivir 75 " MG capsule                Primary Care Provider Office Phone # Fax #    Jo-Ann Fitch [phone] [address]  [place] [address]        Equal Access to Services     CARLOS ELIZONDO : Hadii shayne easley hadmiladyso Soomaali, waaxda luqadaha, qaybta kaalmada adeegyada, elsa fabioin hayaaabby ramirezarden cunningham bhupendra cobb. So [facility] [phone].    ATENCIÓN: Si habla español, tiene a lopez disposición servicios gratuitos de asistencia lingüística. Llame al [phone].    We comply with applicable federal civil rights laws and [place] laws. We do not discriminate on the basis of race, color, national origin, age, disability, sex, sexual orientation, or gender identity.            Thank you!     Thank you for choosing [facility]  for your care. Our goal is always to provide you with excellent care. Hearing back from our patients is one way we can continue to improve our services. Please take a few minutes to complete the written survey that you may receive in the mail after your visit with us. Thank you!             Your Updated Medication List - Protect others around you: Learn how to safely use, store and throw away your medicines at www.disposemymeds.org.          This list is accurate as of 2/26/18  3:03 PM.  Always use your most recent med list.                   Brand Name Dispense Instructions for use Diagnosis    aspirin 81 MG tablet      Take by mouth daily        oseltamivir 75 MG capsule    TAMIFLU    10 capsule    Take 1 capsule (75 mg) by mouth daily for 10 days    Influenza B

## 2018-02-27 ASSESSMENT — PATIENT HEALTH QUESTIONNAIRE - PHQ9: SUM OF ALL RESPONSES TO PHQ QUESTIONS 1-9: 1

## 2018-02-27 ASSESSMENT — ANXIETY QUESTIONNAIRES: GAD7 TOTAL SCORE: 0

## 2019-01-17 NOTE — PROGRESS NOTES
"SUBJECTIVE:         Physical Health:    In general, how would you rate your overall physical health? good    Outside of work, how many days during the week do you exercise? 6-7 days/week    Outside of work, approximately how many minutes a day do you exercise?30-45 minutes    If you drink alcohol do you typically have >3 drinks per day or >7 drinks per week  3-4 weekly    Do you usually eat at least 4 servings of fruit and vegetables a day, include whole grains & fiber and avoid regularly eating high fat or \"junk\" foods? Yes    Do you have any problems taking medications regularly?  No    Do you have any side effects from medications? not applicable    Needs assistance for the following daily activities: no assistance needed    Which of the following safety concerns are present in your home?  none identified     Hearing impairment: Yes, family thinks she does    In the past 6 months, have you been bothered by leaking of urine? no        Mental Health:    In general, how would you rate your overall mental or emotional health? excellent  PHQ-2 Score:        PHQ-9 SCORE 1/5/2018 2/26/2018 1/22/2019   PHQ-9 Total Score - - -   PHQ-9 Total Score 0 1 0     MELANY-7 SCORE 1/5/2018 2/26/2018 1/22/2019   Total Score 0 0 0       Do you feel safe in your environment? Yes    Do you have a Health Care Directive? Yes: Patient states has Advance Directive and will bring in a copy to clinic.      Fall risk:  Fallen 2 or more times in the past year?: No        Do you have sleep apnea, excessive snoring or daytime drowsiness?: no        Reviewed and updated as needed this visit by clinical staff  Tobacco  Allergies  Meds  Med Hx  Surg Hx  Fam Hx  Soc Hx        Reviewed and updated as needed this visit by Provider        Social History     Tobacco Use     Smoking status: Never Smoker     Smokeless tobacco: Never Used   Substance Use Topics     Alcohol use: Yes     Comment: occasionally                           Current providers " sharing in care for this patient include:   Patient Care Team:  Jo-Ann Fitch NP as PCP - General (Family Practice)    The following health maintenance items are reviewed in Epic and correct as of today:  Health Maintenance   Topic Date Due     MAMMO Q1 YR  01/17/2019     FALL RISK ASSESSMENT  02/26/2019     MELANY QUESTIONNAIRE 1 YEAR  02/26/2019     PHQ-9 Q1YR  02/26/2019     DTAP/TDAP/TD IMMUNIZATION (2 - Td) 09/05/2022     LIPID SCREENING Q5 YEARS  01/05/2023     ADVANCE DIRECTIVE PLANNING Q5 YRS  01/05/2023     COLON CANCER SCREEN (SYSTEM ASSIGNED)  04/08/2026     INFLUENZA VACCINE  Completed     ZOSTER IMMUNIZATION  Completed     HEPATITIS C SCREENING  Completed     DEXA SCAN SCREENING (SYSTEM ASSIGNED)  Addressed     IPV IMMUNIZATION  Aged Out     MENINGITIS IMMUNIZATION  Aged Out     Labs reviewed in EPIC  BP Readings from Last 3 Encounters:   01/22/19 110/62   02/26/18 118/72   01/05/18 118/76    Wt Readings from Last 3 Encounters:   01/22/19 66.7 kg (147 lb)   02/26/18 67.6 kg (149 lb)   01/05/18 66.7 kg (147 lb)                  Patient Active Problem List   Diagnosis     Advanced care planning/counseling discussion     Past Surgical History:   Procedure Laterality Date     COLONOSCOPY  2010    every 5 yrs     conization of cervix      pre-cancer cells     GYN SURGERY      total hyst     hysterectomy/complete, no cervix      large clots       Social History     Tobacco Use     Smoking status: Never Smoker     Smokeless tobacco: Never Used   Substance Use Topics     Alcohol use: Yes     Comment: occasionally     Family History   Problem Relation Age of Onset     Cancer Father         throat     Cerebrovascular Disease Father 73        cause of death     Alcohol/Drug Father      Breast Cancer Sister      Breast Cancer Mother      Hypertension Mother      Thyroid Disease Mother      Cancer Mother      Cancer Son         hodgkins disease     Cancer Sister         lung     Cancer - colorectal Brother 56         "cause of death     Alcohol/Drug Brother      Cardiovascular Brother      Cardiovascular Brother      Cancer Brother      Cardiovascular Brother      Cardiovascular Brother      Asthma No family hx of      Diabetes No family hx of          No current outpatient medications on file.     Allergies   Allergen Reactions     Codeine Nausea and Vomiting     Recent Labs   Lab Test 01/05/18  1008 01/04/17  0946 11/04/15  0954 10/21/14  1119   *  --  133* 136*   HDL 70  --  61 67   TRIG 143  --  111 163*   ALT  --  24  --   --    CR 0.87  --   --   --    GFRESTIMATED 65  --   --   --    GFRESTBLACK 78  --   --   --    POTASSIUM 4.2  --   --   --    TSH  --  1.06  --  1.38          ROS:  Constitutional, HEENT, cardiovascular, pulmonary, GI, , musculoskeletal, neuro, skin, endocrine and psych systems are negative, except as otherwise noted.      OBJECTIVE:   /62 (BP Location: Right arm, Patient Position: Sitting, Cuff Size: Adult Regular)   Pulse 60   Resp 14   Ht 1.664 m (5' 5.5\")   Wt 66.7 kg (147 lb)   BMI 24.09 kg/m   Estimated body mass index is 24.09 kg/m  as calculated from the following:    Height as of this encounter: 1.664 m (5' 5.5\").    Weight as of this encounter: 66.7 kg (147 lb).     EXAM:   GENERAL: healthy, alert and no distress  EYES: Eyes grossly normal to inspection, PERRL and conjunctivae and sclerae normal  HENT: ear canals and TM's normal, nose and mouth without ulcers or lesions  NECK: no adenopathy, no asymmetry, masses, or scars and thyroid normal to palpation  RESP: lungs clear to auscultation - no rales, rhonchi or wheezes  CV: regular rate and rhythm, normal S1 S2, no S3 or S4, no murmur, click or rub, no peripheral edema and peripheral pulses strong  ABDOMEN: soft, nontender, no hepatosplenomegaly, no masses and bowel sounds normal  MS: no gross musculoskeletal defects noted, no edema  SKIN: no suspicious lesions or rashes  PSYCH: mentation appears normal, affect " "normal/bright      Mammogram is scheduled      ASSESSMENT / PLAN:   1. Annual physical exam  - Annual physical 1 year    2. Conductive hearing loss, bilateral  - AUDIOLOGY ADULT REFERRAL        COUNSELING:  Reviewed preventive health counseling, as reflected in patient instructions       Regular exercise       Healthy diet/nutrition    BP Readings from Last 1 Encounters:   01/22/19 110/62     Estimated body mass index is 24.09 kg/m  as calculated from the following:    Height as of this encounter: 1.664 m (5' 5.5\").    Weight as of this encounter: 66.7 kg (147 lb).           reports that  has never smoked. she has never used smokeless tobacco.      Appropriate preventive services were discussed with this patient, including applicable screening as appropriate for cardiovascular disease, diabetes, osteopenia/osteoporosis, and glaucoma.  As appropriate for age/gender, discussed screening for colorectal cancer, prostate cancer, breast cancer, and cervical cancer. Checklist reviewing preventive services available has been given to the patient.    Reviewed patients plan of care and provided an AVS.     Counseling Resources:  ATP IV Guidelines  Pooled Cohorts Equation Calculator  Breast Cancer Risk Calculator  FRAX Risk Assessment  ICSI Preventive Guidelines  Dietary Guidelines for Americans, 2010  USDA's MyPlate  ASA Prophylaxis  Lung CA Screening    Jo-Ann Fitch NP  [facility] [place] [facility]  "

## 2019-01-17 NOTE — PATIENT INSTRUCTIONS
Preventive Health Recommendations    See your health care provider every year to    Review health changes.     Discuss preventive care.      Review your medicines if your doctor has prescribed any.      You no longer need a yearly Pap test unless you've had an abnormal Pap test in the past 10 years. If you have vaginal symptoms, such as bleeding or discharge, be sure to talk with your provider about a Pap test.      Every 1 to 2 years, have a mammogram.  If you are over 69, talk with your health care provider about whether or not you want to continue having screening mammograms.      Every 10 years, have a colonoscopy. Or, have a yearly FIT test (stool test). These exams will check for colon cancer.       Have a cholesterol test every 5 years, or more often if your doctor advises it.       Have a diabetes test (fasting glucose) every three years. If you are at risk for diabetes, you should have this test more often.       At age 65, have a bone density scan (DEXA) to check for osteoporosis (brittle bone disease).    Shots:    Get a flu shot each year.    Get a tetanus shot every 10 years.    Talk to your doctor about your pneumonia vaccines. There are now two you should receive - Pneumovax (PPSV 23) and Prevnar (PCV 13).    Talk to your pharmacist about the shingles vaccine.    Talk to your doctor about the hepatitis B vaccine.    Nutrition:     Eat at least 5 servings of fruits and vegetables each day.      Eat whole-grain bread, whole-wheat pasta and brown rice instead of white grains and rice.      Get adequate about Calcium and Vitamin D.     Lifestyle    Exercise at least 150 minutes a week (30 minutes a day, 5 days a week). This will help you control your weight and prevent disease.      Limit alcohol to one drink per day.      No smoking.       Wear sunscreen to prevent skin cancer.       See your dentist twice a year for an exam and cleaning.      See your eye doctor every 1 to 2 years to screen for  conditions such as glaucoma, macular degeneration, cataracts, etc.    Personalized Prevention Plan  You are due for the preventive services outlined below.  Your care team is available to assist you in scheduling these services.  If you have already completed any of these items, please share that information with your care team to update in your medical record.    Health Maintenance Due   Topic Date Due     Flu Vaccine (1) 09/01/2018     Cholesterol Lab - yearly  01/05/2019     Pneumococcal Vaccine (2 of 2 - PPSV23) 01/05/2019     Mammogram - yearly  01/17/2019

## 2019-01-22 ENCOUNTER — OFFICE VISIT (OUTPATIENT)
Dept: FAMILY MEDICINE | Facility: OTHER | Age: 69
End: 2019-01-22
Attending: NURSE PRACTITIONER
Payer: COMMERCIAL

## 2019-01-22 VITALS
SYSTOLIC BLOOD PRESSURE: 110 MMHG | HEART RATE: 60 BPM | BODY MASS INDEX: 23.63 KG/M2 | RESPIRATION RATE: 14 BRPM | DIASTOLIC BLOOD PRESSURE: 62 MMHG | WEIGHT: 147 LBS | HEIGHT: 66 IN

## 2019-01-22 DIAGNOSIS — Z00.00 ANNUAL PHYSICAL EXAM: Primary | ICD-10-CM

## 2019-01-22 DIAGNOSIS — H90.0 CONDUCTIVE HEARING LOSS, BILATERAL: ICD-10-CM

## 2019-01-22 PROCEDURE — 99397 PER PM REEVAL EST PAT 65+ YR: CPT | Performed by: NURSE PRACTITIONER

## 2019-01-22 ASSESSMENT — ANXIETY QUESTIONNAIRES
1. FEELING NERVOUS, ANXIOUS, OR ON EDGE: NOT AT ALL
6. BECOMING EASILY ANNOYED OR IRRITABLE: NOT AT ALL
3. WORRYING TOO MUCH ABOUT DIFFERENT THINGS: NOT AT ALL
5. BEING SO RESTLESS THAT IT IS HARD TO SIT STILL: NOT AT ALL
4. TROUBLE RELAXING: NOT AT ALL
GAD7 TOTAL SCORE: 0
7. FEELING AFRAID AS IF SOMETHING AWFUL MIGHT HAPPEN: NOT AT ALL
IF YOU CHECKED OFF ANY PROBLEMS ON THIS QUESTIONNAIRE, HOW DIFFICULT HAVE THESE PROBLEMS MADE IT FOR YOU TO DO YOUR WORK, TAKE CARE OF THINGS AT HOME, OR GET ALONG WITH OTHER PEOPLE: NOT DIFFICULT AT ALL
2. NOT BEING ABLE TO STOP OR CONTROL WORRYING: NOT AT ALL

## 2019-01-22 ASSESSMENT — MIFFLIN-ST. JEOR: SCORE: 1195.6

## 2019-01-22 ASSESSMENT — PAIN SCALES - GENERAL: PAINLEVEL: NO PAIN (0)

## 2019-01-22 ASSESSMENT — PATIENT HEALTH QUESTIONNAIRE - PHQ9: SUM OF ALL RESPONSES TO PHQ QUESTIONS 1-9: 0

## 2019-01-22 NOTE — NURSING NOTE
"Chief Complaint   Patient presents with     Physical       Initial /62 (BP Location: Right arm, Patient Position: Sitting, Cuff Size: Adult Regular)   Pulse 60   Resp 14   Ht 1.664 m (5' 5.5\")   Wt 66.7 kg (147 lb)   BMI 24.09 kg/m   Estimated body mass index is 24.09 kg/m  as calculated from the following:    Height as of this encounter: 1.664 m (5' 5.5\").    Weight as of this encounter: 66.7 kg (147 lb).  Medication Reconciliation: complete    Magaly Gusman LPN    "

## 2019-01-23 ASSESSMENT — ANXIETY QUESTIONNAIRES: GAD7 TOTAL SCORE: 0

## 2019-01-29 ENCOUNTER — ANCILLARY PROCEDURE (OUTPATIENT)
Dept: MAMMOGRAPHY | Facility: OTHER | Age: 69
End: 2019-01-29
Attending: NURSE PRACTITIONER
Payer: MEDICARE

## 2019-01-29 DIAGNOSIS — Z12.39 SCREENING BREAST EXAMINATION: ICD-10-CM

## 2019-01-29 PROCEDURE — 77063 BREAST TOMOSYNTHESIS BI: CPT | Mod: TC

## 2019-03-14 ENCOUNTER — OFFICE VISIT (OUTPATIENT)
Dept: AUDIOLOGY | Facility: OTHER | Age: 69
End: 2019-03-14
Attending: NURSE PRACTITIONER
Payer: MEDICARE

## 2019-03-14 DIAGNOSIS — H90.3 SENSORINEURAL HEARING LOSS, ASYMMETRICAL: Primary | ICD-10-CM

## 2019-03-14 PROCEDURE — 92557 COMPREHENSIVE HEARING TEST: CPT | Performed by: AUDIOLOGIST

## 2019-03-14 PROCEDURE — 92550 TYMPANOMETRY & REFLEX THRESH: CPT | Performed by: AUDIOLOGIST

## 2019-03-14 NOTE — PROGRESS NOTES
Audiology Evaluation Completed. Please refer SCANNED AUDIOGRAM and/or TYMPANOGRAM for BACKGROUND, RESULTS, RECOMMENDATIONS.      Radha THOMAS, [facility]-A  Audiologist #[id number]

## 2019-03-19 ENCOUNTER — OFFICE VISIT (OUTPATIENT)
Dept: OTOLARYNGOLOGY | Facility: OTHER | Age: 69
End: 2019-03-19
Attending: NURSE PRACTITIONER
Payer: COMMERCIAL

## 2019-03-19 VITALS
TEMPERATURE: 98.1 F | OXYGEN SATURATION: 96 % | WEIGHT: 147 LBS | HEIGHT: 66 IN | SYSTOLIC BLOOD PRESSURE: 112 MMHG | HEART RATE: 77 BPM | DIASTOLIC BLOOD PRESSURE: 64 MMHG | BODY MASS INDEX: 23.63 KG/M2

## 2019-03-19 DIAGNOSIS — H93.19 TINNITUS, UNSPECIFIED LATERALITY: Primary | ICD-10-CM

## 2019-03-19 DIAGNOSIS — H90.3 SENSORINEURAL HEARING LOSS, ASYMMETRICAL: ICD-10-CM

## 2019-03-19 PROCEDURE — 92504 EAR MICROSCOPY EXAMINATION: CPT | Performed by: NURSE PRACTITIONER

## 2019-03-19 PROCEDURE — G0463 HOSPITAL OUTPT CLINIC VISIT: HCPCS | Mod: 25

## 2019-03-19 PROCEDURE — 99213 OFFICE O/P EST LOW 20 MIN: CPT | Mod: 25 | Performed by: NURSE PRACTITIONER

## 2019-03-19 PROCEDURE — G0463 HOSPITAL OUTPT CLINIC VISIT: HCPCS

## 2019-03-19 ASSESSMENT — PAIN SCALES - GENERAL: PAINLEVEL: NO PAIN (0)

## 2019-03-19 ASSESSMENT — MIFFLIN-ST. JEOR: SCORE: 1195.6

## 2019-03-19 NOTE — NURSING NOTE
"Chief Complaint   Patient presents with     Hearing Problem     ASNHL       Initial /64   Pulse 77   Temp 98.1  F (36.7  C) (Tympanic)   Ht 1.664 m (5' 5.5\")   Wt 66.7 kg (147 lb)   SpO2 96%   BMI 24.09 kg/m   Estimated body mass index is 24.09 kg/m  as calculated from the following:    Height as of this encounter: 1.664 m (5' 5.5\").    Weight as of this encounter: 66.7 kg (147 lb).  Medication Reconciliation: complete    Magaly Avalos LPN  "

## 2019-03-19 NOTE — PATIENT INSTRUCTIONS
Complete audiogram in 6 months.  Hearing Aid clearance was given today.  Follow up with ENT as needed.    Thank you for allowing Janell Prescott CNP and our ENT team to participate in your care.  If your medications are too expensive, please give the nurse a call.  We can possibly change this medication.  If you have a scheduling or an appointment question please contact [facility] Unit Coordinator at their direct line [phone]  ALL nursing questions or concerns can be directed to your ENT nurse at: [phone] - [place]

## 2019-03-19 NOTE — PROGRESS NOTES
"Otolaryngology Note         Chief Complaint:     Patient presents with:  Hearing Problem: ASNHL         History of Present Illness:     Therese Medley is here today for hearing aide clearance, however, she needs to look at insurance coverage. Hearing loss has been gradual over the last couple years with no acute changes. Feels at times hear rodney is better in the morning compared to evenings.  No fluctuating hearing loss.  No otalgia, otorrhea or aura fullness.  Vertigo: denies  Tinnitus: mild with aspirin  There is no facial weakness, facial numbness or dysphagia.  No COM, otologic surgeries or trauma.  Noise exposure: vacuums   Family history of adult onset of hearing loss.     Audiogram  3/14/19  Tympanograms Type A bilaterally  Right ear thresholds normal sloping to severe SNHL, SRT 15 dBHL, WRS 88%  Left ear thresholds normal sloping to severe ASNHL, SRT 20 dBHL, WRS 88%   SRT = PTA  Acoustic reflex thresholds: Absent ipsi left/contra right and present ipsi right/contra left         Medications:     No current outpatient medications on file.            Allergies:     Allergies: Codeine          Past Medical History:     Past Medical History:   Diagnosis Date     Backache, unspecified 5/24/2001     Lumbago 5/25/2001     Sciatica 6/18/2001            Past Surgical History:     Past Surgical History:   Procedure Laterality Date     COLONOSCOPY  2010    every 5 yrs     conization of cervix      pre-cancer cells     GYN SURGERY      total hyst     hysterectomy/complete, no cervix      large clots       ENT family history reviewed         Social History:     Social History     Tobacco Use     Smoking status: Never Smoker     Smokeless tobacco: Never Used   Substance Use Topics     Alcohol use: Yes     Comment: occasionally     Drug use: No            Review of Systems:     ROS: See HPI         Physical Exam:     /64   Pulse 77   Temp 98.1  F (36.7  C) (Tympanic)   Ht 1.664 m (5' 5.5\")   Wt 66.7 kg " (147 lb)   SpO2 96%   BMI 24.09 kg/m    General - The patient is well nourished and well developed, and appears to have good nutritional status.  Alert and oriented to person and place, answers questions and cooperates with examination appropriately.   Head and Face - Normocephalic and atraumatic, with no gross asymmetry noted.  The facial nerve is intact, with strong symmetric movements.  Voice and Breathing - The patient was breathing comfortably without the use of accessory muscles. There was no wheezing, stridor. The patients voice was clear and strong, and had appropriate pitch and quality.  Ears - External ear normal. The ears were examined under microscopy bilaterally.  The right and left external auditory canals are patent, the tympanic membranes are intact without effusion or worrisome retractions.  Normal bony landmarks are appreciated.   Eyes - Extraocular movements intact, and the pupils were reactive to light. Sclera were not icteric or injected, conjunctiva were pink and moist.  Mouth - Examination of the oral cavity showed pink, healthy oral mucosa. Dentition in good condition. No lesions or ulcerations noted. The tongue was mobile and midline.   Throat - The walls of the oropharynx were smooth, pink, moist, symmetric, and had no lesions or ulcerations.  The tonsillar pillars and soft palate were symmetric. The uvula was midline on elevation.    Neck - Normal midline excursion of the laryngotracheal complex during swallowing.  Full range of motion on passive movement.  Palpation of the occipital, submental, submandibular, internal jugular chain, and supraclavicular nodes did not demonstrate any abnormal lymph nodes or masses.  Palpation of the thyroid was soft and smooth, with no nodules or goiter appreciated.  The trachea was mobile and midline.  Nose - External contour is symmetric, no gross deflection or scars.  Nasal mucosa is pink and moist with no abnormal mucus.  The septum and turbinates  were evaluated: mild right anterior DNS.  No polyps, masses, or purulence noted on examination.         Assessment and Plan:       ICD-10-CM    1. Tinnitus, unspecified laterality H93.19    2. Sensorineural hearing loss, asymmetrical H90.5      The current leading theory on tinnitus is that it originates from the central nervous system itself, and is usually associated with hearing loss and injury to cochlear hair cells.  Also discussed were steps that can be taken to mask the noise, such as a low volume de-tuned radio, a fan in the background, and hearing aids. Correlation with stress, anxiety, depression, and high blood pressure were also discussed. The patient was also cautioned on the numerous expensive non-pharmaceutical options that are advertised, and have no proven benefit.    Educated that aspirin use can cause tinnitus.    The possibility of acoustic neuroma causing asymmetrical nerve hearing loss was discussed .I discussed the indication for MRI of the Brain and Internal Auditory Canals.   The patient was told acoustic neuromas are very rare, benign, and generally treated by observation only. Recommended proceeding with MRI of the IAC to rule out an acoustic neuroma.     Patient defers audiogram at this time. A follow-up audiogram is recommended in 6 months.  This should be sooner if a patient develops vertigo, new or asymmetric hearing loss or unresolved unilateral tinnitus.  If these symptoms were to develop, an MRI would need to be obtained.    At this time, will provide medical clearance for hearing aides.    Encouraged her to follow-up sooner as needed.     Janell Prescott NP  ENT  [facility], [place]  [phone]

## 2019-03-19 NOTE — LETTER
3/19/2019         RE: Therese Medley  [address] [place] [address]        Dear Colleague,    Thank you for referring your patient, Therese Medley, to the [facility]. Please see a copy of my visit note below.    Otolaryngology Note         Chief Complaint:     Patient presents with:  Hearing Problem: ASNHL         History of Present Illness:     Therese Medley is here today for hearing aide clearance, however, she needs to look at insurance coverage. Hearing loss has been gradual over the last couple years with no acute changes. Feels at times hear rodney is better in the morning compared to evenings.  No fluctuating hearing loss.  No otalgia, otorrhea or aura fullness.  Vertigo: denies  Tinnitus: mild with aspirin  There is no facial weakness, facial numbness or dysphagia.  No COM, otologic surgeries or trauma.  Noise exposure: vacuums   Family history of adult onset of hearing loss.     Audiogram  3/14/19  Tympanograms Type A bilaterally  Right ear thresholds normal sloping to severe SNHL, SRT 15 dBHL, WRS 88%  Left ear thresholds normal sloping to severe ASNHL, SRT 20 dBHL, WRS 88%   SRT = PTA  Acoustic reflex thresholds: Absent ipsi left/contra right and present ipsi right/contra left         Medications:     No current outpatient medications on file.            Allergies:     Allergies: Codeine          Past Medical History:     Past Medical History:   Diagnosis Date     Backache, unspecified 5/24/2001     Lumbago 5/25/2001     Sciatica 6/18/2001            Past Surgical History:     Past Surgical History:   Procedure Laterality Date     COLONOSCOPY  2010    every 5 yrs     conization of cervix      pre-cancer cells     GYN SURGERY      total hyst     hysterectomy/complete, no cervix      large clots       ENT family history reviewed         Social History:     Social History     Tobacco Use     Smoking status: Never Smoker     Smokeless tobacco: Never Used  "  Substance Use Topics     Alcohol use: Yes     Comment: occasionally     Drug use: No            Review of Systems:     ROS: See HPI         Physical Exam:     /64   Pulse 77   Temp 98.1  F (36.7  C) (Tympanic)   Ht 1.664 m (5' 5.5\")   Wt 66.7 kg (147 lb)   SpO2 96%   BMI 24.09 kg/m     General - The patient is well nourished and well developed, and appears to have good nutritional status.  Alert and oriented to person and place, answers questions and cooperates with examination appropriately.   Head and Face - Normocephalic and atraumatic, with no gross asymmetry noted.  The facial nerve is intact, with strong symmetric movements.  Voice and Breathing - The patient was breathing comfortably without the use of accessory muscles. There was no wheezing, stridor. The patients voice was clear and strong, and had appropriate pitch and quality.  Ears - External ear normal. The ears were examined under microscopy bilaterally.  The right and left external auditory canals are patent, the tympanic membranes are intact without effusion or worrisome retractions.  Normal bony landmarks are appreciated.   Eyes - Extraocular movements intact, and the pupils were reactive to light. Sclera were not icteric or injected, conjunctiva were pink and moist.  Mouth - Examination of the oral cavity showed pink, healthy oral mucosa. Dentition in good condition. No lesions or ulcerations noted. The tongue was mobile and midline.   Throat - The walls of the oropharynx were smooth, pink, moist, symmetric, and had no lesions or ulcerations.  The tonsillar pillars and soft palate were symmetric. The uvula was midline on elevation.    Neck - Normal midline excursion of the laryngotracheal complex during swallowing.  Full range of motion on passive movement.  Palpation of the occipital, submental, submandibular, internal jugular chain, and supraclavicular nodes did not demonstrate any abnormal lymph nodes or masses.  Palpation of the " thyroid was soft and smooth, with no nodules or goiter appreciated.  The trachea was mobile and midline.  Nose - External contour is symmetric, no gross deflection or scars.  Nasal mucosa is pink and moist with no abnormal mucus.  The septum and turbinates were evaluated: mild right anterior DNS.  No polyps, masses, or purulence noted on examination.         Assessment and Plan:       ICD-10-CM    1. Tinnitus, unspecified laterality H93.19    2. Sensorineural hearing loss, asymmetrical H90.5      The current leading theory on tinnitus is that it originates from the central nervous system itself, and is usually associated with hearing loss and injury to cochlear hair cells.  Also discussed were steps that can be taken to mask the noise, such as a low volume de-tuned radio, a fan in the background, and hearing aids. Correlation with stress, anxiety, depression, and high blood pressure were also discussed. The patient was also cautioned on the numerous expensive non-pharmaceutical options that are advertised, and have no proven benefit.    Educated that aspirin use can cause tinnitus.    The possibility of acoustic neuroma causing asymmetrical nerve hearing loss was discussed .I discussed the indication for MRI of the Brain and Internal Auditory Canals.   The patient was told acoustic neuromas are very rare, benign, and generally treated by observation only. Recommended proceeding with MRI of the IAC to rule out an acoustic neuroma.     Patient defers audiogram at this time. A follow-up audiogram is recommended in 6 months.  This should be sooner if a patient develops vertigo, new or asymmetric hearing loss or unresolved unilateral tinnitus.  If these symptoms were to develop, an MRI would need to be obtained.    At this time, will provide medical clearance for hearing aides.    Encouraged her to follow-up sooner as needed.     Janell Prescott NP  ENT  [facility], [place]  [phone]      Again, thank you for  allowing me to participate in the care of your patient.        Sincerely,        JOHANA Esteban CNP

## 2019-10-05 ENCOUNTER — HEALTH MAINTENANCE LETTER (OUTPATIENT)
Age: 69
End: 2019-10-05

## 2020-01-22 NOTE — PATIENT INSTRUCTIONS
"ASSESSMENT / PLAN:   1. Routine general medical examination at a health care facility  - Lipid Profile  - UA reflex to Microscopic and Culture - [place] [facility]  - CBC with platelets differential    2. Other skin changes  - DERMATOLOGY REFERRAL        COUNSELING:  Reviewed preventive health counseling, as reflected in patient instructions       Regular exercise       Healthy diet/nutrition       Vision screening       Hearing screening    Estimated body mass index is 23.93 kg/m  as calculated from the following:    Height as of 3/19/19: 1.664 m (5' 5.5\").    Weight as of this encounter: 66.2 kg (146 lb).          Preventive Health Recommendations    See your health care provider every year to    Review health changes.     Discuss preventive care.      Review your medicines if your doctor has prescribed any.      You no longer need a yearly Pap test unless you've had an abnormal Pap test in the past 10 years. If you have vaginal symptoms, such as bleeding or discharge, be sure to talk with your provider about a Pap test.      Every 1 to 2 years, have a mammogram.  If you are over 69, talk with your health care provider about whether or not you want to continue having screening mammograms.      Every 10 years, have a colonoscopy. Or, have a yearly FIT test (stool test). These exams will check for colon cancer.       Have a cholesterol test every 5 years, or more often if your doctor advises it.       Have a diabetes test (fasting glucose) every three years. If you are at risk for diabetes, you should have this test more often.       At age 65, have a bone density scan (DEXA) to check for osteoporosis (brittle bone disease).    Shots:    Get a flu shot each year.    Get a tetanus shot every 10 years.    Talk to your doctor about your pneumonia vaccines. There are now two you should receive - Pneumovax (PPSV 23) and Prevnar (PCV 13).    Talk to your pharmacist about the shingles vaccine.    Talk to your doctor about " the hepatitis B vaccine.    Nutrition:     Eat at least 5 servings of fruits and vegetables each day.      Eat whole-grain bread, whole-wheat pasta and brown rice instead of white grains and rice.      Get adequate about Calcium and Vitamin D.     Lifestyle    Exercise at least 150 minutes a week (30 minutes a day, 5 days a week). This will help you control your weight and prevent disease.      Limit alcohol to one drink per day.      No smoking.       Wear sunscreen to prevent skin cancer.       See your dentist twice a year for an exam and cleaning.      See your eye doctor every 1 to 2 years to screen for conditions such as glaucoma, macular degeneration, cataracts, etc.    Personalized Prevention Plan  You are due for the preventive services outlined below.  Your care team is available to assist you in scheduling these services.  If you have already completed any of these items, please share that information with your care team to update in your medical record.    Health Maintenance Due   Topic Date Due     Pneumococcal Vaccine (2 of 2 - PPSV23) 01/05/2019     PHQ-2  01/01/2020     Annual Wellness Visit  01/22/2020     Anxiety Assessment  01/22/2020     Depression Assessment  01/22/2020     Mammogram  01/29/2020

## 2020-01-22 NOTE — PROGRESS NOTES
SUBJECTIVE:   Therese Medley is a 69 year old female who presents for Preventive Visit.      Do you feel safe in your environment? Yes    Have you ever done Advance Care Planning? (For example, a Health Directive, POLST, or a discussion with a medical provider or your loved ones about your wishes): Yes, advance care planning is on file.      Fall risk  Fall Risk Assessment not completed.      PHQ-9 SCORE 2/26/2018 1/22/2019 1/27/2020   PHQ-9 Total Score - - -   PHQ-9 Total Score 1 0 0     MELANY-7 SCORE 2/26/2018 1/22/2019 1/27/2020   Total Score 0 0 0           Mini-CogTM Copyright S Malinda. Licensed by the author for use in [facility]; reprinted with permission ([email or web address]). All rights reserved.          Do you have sleep apnea, excessive snoring or daytime drowsiness?: no    Reviewed and updated as needed this visit by clinical staff  Tobacco  Allergies  Meds  Med Hx  Surg Hx  Fam Hx  Soc Hx        Reviewed and updated as needed this visit by Provider  Tobacco        Social History     Tobacco Use     Smoking status: Never Smoker     Smokeless tobacco: Never Used   Substance Use Topics     Alcohol use: Yes     Comment: occasionally     If you drink alcohol do you typically have >3 drinks per day or >7 drinks per week? Yes      Alcohol Use 1/27/2020   Prescreen: >3 drinks/day or >7 drinks/week? Yes   No flowsheet data found.      Current providers sharing in care for this patient include:   Patient Care Team:  Jo-Ann Fitch CNP as PCP - General (Family Practice)  Jo-Ann Fitch CNP as Assigned PCP         The following health maintenance items are reviewed in Epic and correct as of today:  Health Maintenance   Topic Date Due     MEDICARE ANNUAL WELLNESS VISIT  01/22/2020     MAMMO SCREENING  01/29/2020     FALL RISK ASSESSMENT  03/19/2020     MELANY ASSESSMENT  01/27/2021     PHQ-9  01/27/2021     DTAP/TDAP/TD IMMUNIZATION (2 - Td) 09/05/2022     LIPID  01/05/2023     ADVANCE CARE PLANNING   01/27/2025     COLONOSCOPY  04/08/2026     DEXA  Completed     HEPATITIS C SCREENING  Completed     PHQ-2  Completed     INFLUENZA VACCINE  Completed     PNEUMOCOCCAL IMMUNIZATION 65+ LOW/MEDIUM RISK  Completed     ZOSTER IMMUNIZATION  Completed     IPV IMMUNIZATION  Aged Out     MENINGITIS IMMUNIZATION  Aged Out         Lab work is in process      Labs reviewed in Epic    BP Readings from Last 3 Encounters:   01/27/20 122/78   03/19/19 112/64   01/22/19 110/62    Wt Readings from Last 3 Encounters:   01/27/20 66.2 kg (146 lb)   03/19/19 66.7 kg (147 lb)   01/22/19 66.7 kg (147 lb)                  Patient Active Problem List   Diagnosis     Advanced care planning/counseling discussion     Past Surgical History:   Procedure Laterality Date     COLONOSCOPY  2010    every 5 yrs     conization of cervix      pre-cancer cells     GYN SURGERY      total hyst     hysterectomy/complete, no cervix      large clots       Social History     Tobacco Use     Smoking status: Never Smoker     Smokeless tobacco: Never Used   Substance Use Topics     Alcohol use: Yes     Comment: occasionally     Family History   Problem Relation Age of Onset     Cancer Father         throat     Cerebrovascular Disease Father 73        cause of death     Alcohol/Drug Father      Breast Cancer Sister      Breast Cancer Mother      Hypertension Mother      Thyroid Disease Mother      Cancer Mother      Cancer Son         hodgkins disease     Cancer Sister         lung     Cancer - colorectal Brother 56        cause of death     Alcohol/Drug Brother      Cardiovascular Brother      Cardiovascular Brother      Cancer Brother      Cardiovascular Brother      Cardiovascular Brother      Asthma No family hx of      Diabetes No family hx of              No current outpatient medications on file.         Allergies   Allergen Reactions     Codeine Nausea and Vomiting         Recent Labs   Lab Test 01/05/18  1008 01/04/17  0946 11/04/15  0954 10/21/14  1119  "  *  --  133* 136*   HDL 70  --  61 67   TRIG 143  --  111 163*   ALT  --  24  --   --    CR 0.87  --   --   --    GFRESTIMATED 65  --   --   --    GFRESTBLACK 78  --   --   --    POTASSIUM 4.2  --   --   --    TSH  --  1.06  --  1.38        Pneumonia Vaccine: Updated today        Review of Systems  Constitutional, HEENT, cardiovascular, pulmonary, GI, , musculoskeletal, neuro, skin, endocrine and psych systems are negative, except as otherwise noted.    OBJECTIVE:   /78 (BP Location: Left arm, Patient Position: Sitting, Cuff Size: Adult Regular)   Pulse 73   Temp 98.7  F (37.1  C) (Tympanic)   Wt 66.2 kg (146 lb)   SpO2 97%   BMI 23.93 kg/m   Estimated body mass index is 23.93 kg/m  as calculated from the following:    Height as of 3/19/19: 1.664 m (5' 5.5\").    Weight as of this encounter: 66.2 kg (146 lb).         Physical Exam  GENERAL: healthy, alert and no distress  EYES: Eyes grossly normal to inspection, PERRL and conjunctivae and sclerae normal  HENT: ear canals and TM's normal, nose and mouth without ulcers or lesions  NECK: no adenopathy, no asymmetry, masses, or scars and thyroid normal to palpation  RESP: lungs clear to auscultation - no rales, rhonchi or wheezes  CV: regular rate and rhythm, normal S1 S2, no S3 or S4, no murmur, click or rub, no peripheral edema and peripheral pulses strong  ABDOMEN: soft, nontender, no hepatosplenomegaly, no masses and bowel sounds normal  MS: no gross musculoskeletal defects noted, no edema  SKIN: no suspicious lesions or rashes  PSYCH: mentation appears normal, affect normal/bright        ASSESSMENT / PLAN:   1. Routine general medical examination at a health care facility  - Lipid Profile  - UA reflex to Microscopic and Culture - [facility]  - CBC with platelets differential    2. Other skin changes  - DERMATOLOGY REFERRAL        COUNSELING:  Reviewed preventive health counseling, as reflected in patient instructions       Regular " "exercise       Healthy diet/nutrition       Vision screening       Hearing screening    Estimated body mass index is 23.93 kg/m  as calculated from the following:    Height as of 3/19/19: 1.664 m (5' 5.5\").    Weight as of this encounter: 66.2 kg (146 lb).           reports that she has never smoked. She has never used smokeless tobacco.      Appropriate preventive services were discussed with this patient, including applicable screening as appropriate for cardiovascular disease, diabetes, osteopenia/osteoporosis, and glaucoma.  As appropriate for age/gender, discussed screening for colorectal cancer, prostate cancer, breast cancer, and cervical cancer. Checklist reviewing preventive services available has been given to the patient.    Reviewed patients plan of care and provided an AVS. The Basic Care Plan (routine screening as documented in Health Maintenance) for Therese meets the Care Plan requirement. This Care Plan has been established and reviewed with the Patient.    Counseling Resources:  ATP IV Guidelines  Pooled Cohorts Equation Calculator  Breast Cancer Risk Calculator  FRAX Risk Assessment  ICSI Preventive Guidelines  Dietary Guidelines for Americans, 2010  USDA's MyPlate  ASA Prophylaxis  Lung CA Screening    Jo-Ann Fitch CNP  [facility] [place] [facility]    Identified Health Risks:  "

## 2020-01-27 ENCOUNTER — OFFICE VISIT (OUTPATIENT)
Dept: FAMILY MEDICINE | Facility: OTHER | Age: 70
End: 2020-01-27
Attending: NURSE PRACTITIONER
Payer: COMMERCIAL

## 2020-01-27 VITALS
BODY MASS INDEX: 23.93 KG/M2 | DIASTOLIC BLOOD PRESSURE: 78 MMHG | WEIGHT: 146 LBS | TEMPERATURE: 98.7 F | HEART RATE: 73 BPM | SYSTOLIC BLOOD PRESSURE: 122 MMHG | OXYGEN SATURATION: 97 %

## 2020-01-27 DIAGNOSIS — Z00.00 ROUTINE GENERAL MEDICAL EXAMINATION AT A HEALTH CARE FACILITY: Primary | ICD-10-CM

## 2020-01-27 DIAGNOSIS — R23.8 OTHER SKIN CHANGES: ICD-10-CM

## 2020-01-27 LAB
ALBUMIN UR-MCNC: NEGATIVE MG/DL
APPEARANCE UR: CLEAR
BASOPHILS # BLD AUTO: 0 10E9/L (ref 0–0.2)
BASOPHILS NFR BLD AUTO: 0.5 %
BILIRUB UR QL STRIP: NEGATIVE
CHOLEST SERPL-MCNC: 246 MG/DL
COLOR UR AUTO: YELLOW
DIFFERENTIAL METHOD BLD: NORMAL
EOSINOPHIL # BLD AUTO: 0.3 10E9/L (ref 0–0.7)
EOSINOPHIL NFR BLD AUTO: 3.9 %
ERYTHROCYTE [DISTWIDTH] IN BLOOD BY AUTOMATED COUNT: 13.9 % (ref 10–15)
GLUCOSE UR STRIP-MCNC: NEGATIVE MG/DL
HCT VFR BLD AUTO: 44.9 % (ref 35–47)
HDLC SERPL-MCNC: 65 MG/DL
HGB BLD-MCNC: 15.4 G/DL (ref 11.7–15.7)
HGB UR QL STRIP: ABNORMAL
KETONES UR STRIP-MCNC: NEGATIVE MG/DL
LDLC SERPL CALC-MCNC: 151 MG/DL
LEUKOCYTE ESTERASE UR QL STRIP: NEGATIVE
LYMPHOCYTES # BLD AUTO: 2.9 10E9/L (ref 0.8–5.3)
LYMPHOCYTES NFR BLD AUTO: 39.2 %
MCH RBC QN AUTO: 29.8 PG (ref 26.5–33)
MCHC RBC AUTO-ENTMCNC: 34.3 G/DL (ref 31.5–36.5)
MCV RBC AUTO: 87 FL (ref 78–100)
MONOCYTES # BLD AUTO: 0.7 10E9/L (ref 0–1.3)
MONOCYTES NFR BLD AUTO: 9.5 %
NEUTROPHILS # BLD AUTO: 3.5 10E9/L (ref 1.6–8.3)
NEUTROPHILS NFR BLD AUTO: 46.9 %
NITRATE UR QL: NEGATIVE
NONHDLC SERPL-MCNC: 181 MG/DL
PH UR STRIP: 6.5 PH (ref 5–7)
PLATELET # BLD AUTO: 236 10E9/L (ref 150–450)
RBC # BLD AUTO: 5.17 10E12/L (ref 3.8–5.2)
RBC #/AREA URNS AUTO: NORMAL /HPF
SOURCE: ABNORMAL
SP GR UR STRIP: 1.02 (ref 1–1.03)
TRIGL SERPL-MCNC: 148 MG/DL
UROBILINOGEN UR STRIP-ACNC: 0.2 EU/DL (ref 0.2–1)
WBC # BLD AUTO: 7.5 10E9/L (ref 4–11)
WBC #/AREA URNS AUTO: NORMAL /HPF

## 2020-01-27 PROCEDURE — 81001 URINALYSIS AUTO W/SCOPE: CPT | Mod: ZL | Performed by: NURSE PRACTITIONER

## 2020-01-27 PROCEDURE — 90732 PPSV23 VACC 2 YRS+ SUBQ/IM: CPT

## 2020-01-27 PROCEDURE — 90471 IMMUNIZATION ADMIN: CPT | Performed by: NURSE PRACTITIONER

## 2020-01-27 PROCEDURE — 36415 COLL VENOUS BLD VENIPUNCTURE: CPT | Mod: ZL | Performed by: NURSE PRACTITIONER

## 2020-01-27 PROCEDURE — 80061 LIPID PANEL: CPT | Mod: ZL | Performed by: NURSE PRACTITIONER

## 2020-01-27 PROCEDURE — 99397 PER PM REEVAL EST PAT 65+ YR: CPT | Performed by: NURSE PRACTITIONER

## 2020-01-27 PROCEDURE — 85025 COMPLETE CBC W/AUTO DIFF WBC: CPT | Mod: ZL | Performed by: NURSE PRACTITIONER

## 2020-01-27 ASSESSMENT — ANXIETY QUESTIONNAIRES
GAD7 TOTAL SCORE: 0
6. BECOMING EASILY ANNOYED OR IRRITABLE: NOT AT ALL
3. WORRYING TOO MUCH ABOUT DIFFERENT THINGS: NOT AT ALL
IF YOU CHECKED OFF ANY PROBLEMS ON THIS QUESTIONNAIRE, HOW DIFFICULT HAVE THESE PROBLEMS MADE IT FOR YOU TO DO YOUR WORK, TAKE CARE OF THINGS AT HOME, OR GET ALONG WITH OTHER PEOPLE: NOT DIFFICULT AT ALL
7. FEELING AFRAID AS IF SOMETHING AWFUL MIGHT HAPPEN: NOT AT ALL
4. TROUBLE RELAXING: NOT AT ALL
2. NOT BEING ABLE TO STOP OR CONTROL WORRYING: NOT AT ALL
5. BEING SO RESTLESS THAT IT IS HARD TO SIT STILL: NOT AT ALL
1. FEELING NERVOUS, ANXIOUS, OR ON EDGE: NOT AT ALL

## 2020-01-27 ASSESSMENT — PATIENT HEALTH QUESTIONNAIRE - PHQ9: SUM OF ALL RESPONSES TO PHQ QUESTIONS 1-9: 0

## 2020-01-27 ASSESSMENT — PAIN SCALES - GENERAL: PAINLEVEL: NO PAIN (0)

## 2020-01-27 NOTE — RESULT ENCOUNTER NOTE
Lipid profile continues to be on the high side  Low fat diet, Fish oil 3 daily  Could try low dose statin if willing.    Remaining labs are normal.    The 10-year ASCVD risk score (Carlitos LINK Jr., et al., 2013) is: 9.9%    Values used to calculate the score:      Age: 69 years      Sex: Female      Is Non- : No      Diabetic: No      Tobacco smoker: No      Systolic Blood Pressure: 122 mmHg      Is BP treated: No      HDL Cholesterol: 65 mg/dL      Total Cholesterol: 246 mg/dL    [facility]Ann [facility]  [phone]

## 2020-01-27 NOTE — NURSING NOTE
"Chief Complaint   Patient presents with     Physical       Initial /80 (BP Location: Left arm, Patient Position: Sitting, Cuff Size: Adult Regular)   Pulse 73   Temp 98.7  F (37.1  C) (Tympanic)   Wt 66.2 kg (146 lb)   SpO2 97%   BMI 23.93 kg/m   Estimated body mass index is 23.93 kg/m  as calculated from the following:    Height as of 3/19/19: 1.664 m (5' 5.5\").    Weight as of this encounter: 66.2 kg (146 lb).  Medication Reconciliation: complete  Ashley A. Lechevalier, LPN  "

## 2020-01-28 ASSESSMENT — ANXIETY QUESTIONNAIRES: GAD7 TOTAL SCORE: 0

## 2020-02-03 ENCOUNTER — ANCILLARY PROCEDURE (OUTPATIENT)
Dept: MAMMOGRAPHY | Facility: OTHER | Age: 70
End: 2020-02-03
Attending: NURSE PRACTITIONER
Payer: MEDICARE

## 2020-02-03 DIAGNOSIS — Z12.31 VISIT FOR SCREENING MAMMOGRAM: ICD-10-CM

## 2020-02-03 PROCEDURE — 77063 BREAST TOMOSYNTHESIS BI: CPT | Mod: TC

## 2020-03-02 ENCOUNTER — TRANSFERRED RECORDS (OUTPATIENT)
Dept: HEALTH INFORMATION MANAGEMENT | Facility: CLINIC | Age: 70
End: 2020-03-02

## 2020-11-14 ENCOUNTER — HEALTH MAINTENANCE LETTER (OUTPATIENT)
Age: 70
End: 2020-11-14

## 2021-02-03 NOTE — PROGRESS NOTES
SUBJECTIVE:   CC: Therese Medley is an 70 year old woman who presents for preventive health visit.   Patient has been advised of split billing requirements and indicates understanding: Yes       Healthy Habits:    Do you get at least three servings of calcium containing foods daily (dairy, green leafy vegetables, etc.)? yes    Amount of exercise or daily activities, outside of work: 1/2 hour(s) per day    Problems taking medications regularly not applicable    Medication side effects: No    Have you had an eye exam in the past two years? yes    Do you see a dentist twice per year? yes    Do you have sleep apnea, excessive snoring or daytime drowsiness?no      Ability to successfully perform activities of daily living: Yes, no assistance needed  Home safety:  none identified   Hearing impairment: Yes, Difficulty following a conversation in a noisy restaurant or crowded room.  Feel that people are mumbling or not speaking clearly.            PHQ 1/22/2019 1/27/2020 2/15/2021   PHQ-9 Total Score 0 0 0   Q9: Thoughts of better off dead/self-harm past 2 weeks Not at all Not at all Not at all     MELANY-7 SCORE 1/22/2019 1/27/2020 2/15/2021   Total Score 0 0 0       Abuse: Current or Past (Physical, Sexual or Emotional) - No  Do you feel safe in your environment? Yes      Social History     Tobacco Use     Smoking status: Never Smoker     Smokeless tobacco: Never Used   Substance Use Topics     Alcohol use: Yes     Comment: occasionally       If you drink alcohol do you typically have >3 drinks per day or >7 drinks per week? No      Alcohol Use 2/15/2021   Prescreen: >3 drinks/day or >7 drinks/week? No     Reviewed orders with patient.  Reviewed health maintenance and updated orders accordingly - Yes  Lab work is in process  Labs reviewed in EPIC  BP Readings from Last 3 Encounters:   02/15/21 118/76   01/27/20 122/78   03/19/19 112/64    Wt Readings from Last 3 Encounters:   02/15/21 66.5 kg (146 lb 8 oz)    01/27/20 66.2 kg (146 lb)   03/19/19 66.7 kg (147 lb)                  Patient Active Problem List   Diagnosis     Advanced care planning/counseling discussion     Past Surgical History:   Procedure Laterality Date     COLONOSCOPY  2010    every 5 yrs     conization of cervix      pre-cancer cells     GYN SURGERY      total hyst     hysterectomy/complete, no cervix      large clots       Social History     Tobacco Use     Smoking status: Never Smoker     Smokeless tobacco: Never Used   Substance Use Topics     Alcohol use: Yes     Comment: occasionally     Family History   Problem Relation Age of Onset     Cancer Father         throat     Cerebrovascular Disease Father 73        cause of death     Alcohol/Drug Father      Breast Cancer Sister      Breast Cancer Mother      Hypertension Mother      Thyroid Disease Mother      Cancer Mother      Cancer Son         hodgkins disease     Cancer Sister         lung     Cancer - colorectal Brother 56        cause of death     Alcohol/Drug Brother      Cardiovascular Brother      Cardiovascular Brother      Cancer Brother      Cardiovascular Brother      Cardiovascular Brother      Asthma No family hx of      Diabetes No family hx of          No current outpatient medications on file.     Allergies   Allergen Reactions     Codeine Nausea and Vomiting     Recent Labs   Lab Test 01/27/20  0943 01/05/18  1008 01/04/17  0946 11/04/15  0954 10/21/14  1119   * 156*  --  133* 136*   HDL 65 70  --  61 67   TRIG 148 143  --  111 163*   ALT  --   --  24  --   --    CR  --  0.87  --   --   --    GFRESTIMATED  --  65  --   --   --    GFRESTBLACK  --  78  --   --   --    POTASSIUM  --  4.2  --   --   --    TSH  --   --  1.06  --  1.38            History of abnormal Pap smear: NO - age 63 - see link Cervical Cytology Screening Guidelines     Reviewed and updated as needed this visit by clinical staff  Tobacco  Allergies  Meds              Reviewed and updated as needed this  "visit by Provider                Past Medical History:   Diagnosis Date     Backache, unspecified 5/24/2001     Lumbago 5/25/2001     Sciatica 6/18/2001        Past Surgical History:   Procedure Laterality Date     COLONOSCOPY  2010    every 5 yrs     conization of cervix      pre-cancer cells     GYN SURGERY      total hyst     hysterectomy/complete, no cervix      large clots       OB History   No obstetric history on file.       Review of Systems  CONSTITUTIONAL: NEGATIVE for fever, chills, change in weight  INTEGUMENTARY/SKIN: NEGATIVE for worrisome rashes, moles or lesions  EYES: NEGATIVE for vision changes or irritation  ENT: NEGATIVE for ear, mouth and throat problems  RESP: NEGATIVE for significant cough or SOB  BREAST: NEGATIVE for masses, tenderness or discharge  CV: NEGATIVE for chest pain, palpitations or peripheral edema  GI: NEGATIVE for nausea, abdominal pain, heartburn, or change in bowel habits  : NEGATIVE for unusual urinary or vaginal symptoms. No vaginal bleeding.  MUSCULOSKELETAL: NEGATIVE for significant arthralgias or myalgia  NEURO: NEGATIVE for weakness, dizziness or paresthesias  PSYCHIATRIC: NEGATIVE for changes in mood or affect        OBJECTIVE:   /76   Pulse 68   Temp 98.1  F (36.7  C)   Ht 1.664 m (5' 5.5\")   Wt 66.5 kg (146 lb 8 oz)   SpO2 99%   BMI 24.01 kg/m         Physical Exam  GENERAL: healthy, alert and no distress  EYES: Eyes grossly normal to inspection, PERRL and conjunctivae and sclerae normal  HENT: ear canals and TM's normal, nose and mouth without ulcers or lesions  NECK: no adenopathy, no asymmetry, masses, or scars and thyroid normal to palpation  RESP: lungs clear to auscultation - no rales, rhonchi or wheezes  CV: regular rate and rhythm, normal S1 S2, no S3 or S4, no murmur, click or rub, no peripheral edema and peripheral pulses strong  ABDOMEN: soft, nontender, no hepatosplenomegaly, no masses and bowel sounds normal  MS: no gross musculoskeletal " "defects noted, no edema  SKIN: no suspicious lesions or rashes  PSYCH: mentation appears normal, affect normal/bright        ASSESSMENT/PLAN:   1. Annual physical exam  - UA reflex to Microscopic and Culture - [place] [facility]  - Lipid Profile (Chol, Trig, HDL, LDL calc)  - Comprehensive metabolic panel  - CBC with platelets differential  - Urine Microscopic    2. Encounter for screening mammogram for malignant neoplasm of breast  - [facility] SCREENING DIGITAL BILATERAL ([place]); Future      Patient has been advised of split billing requirements and indicates understanding: Yes       COUNSELING:  Reviewed preventive health counseling, as reflected in patient instructions       Regular exercise       Healthy diet/nutrition       Vision screening      Estimated body mass index is 24.01 kg/m  as calculated from the following:    Height as of this encounter: 1.664 m (5' 5.5\").    Weight as of this encounter: 66.5 kg (146 lb 8 oz).        She reports that she has never smoked. She has never used smokeless tobacco.      Counseling Resources:  ATP IV Guidelines  Pooled Cohorts Equation Calculator  Breast Cancer Risk Calculator  BRCA-Related Cancer Risk Assessment: FHS-7 Tool  FRAX Risk Assessment  ICSI Preventive Guidelines  Dietary Guidelines for Americans, 2010  USDA's MyPlate  ASA Prophylaxis  Lung CA Screening    Jo-Ann Fitch CNP  [facility] [place] [facility]  "

## 2021-02-15 ENCOUNTER — OFFICE VISIT (OUTPATIENT)
Dept: FAMILY MEDICINE | Facility: OTHER | Age: 71
End: 2021-02-15
Attending: NURSE PRACTITIONER
Payer: COMMERCIAL

## 2021-02-15 VITALS
BODY MASS INDEX: 23.54 KG/M2 | HEART RATE: 68 BPM | DIASTOLIC BLOOD PRESSURE: 76 MMHG | HEIGHT: 66 IN | SYSTOLIC BLOOD PRESSURE: 118 MMHG | TEMPERATURE: 98.1 F | WEIGHT: 146.5 LBS | OXYGEN SATURATION: 99 %

## 2021-02-15 DIAGNOSIS — Z12.31 ENCOUNTER FOR SCREENING MAMMOGRAM FOR MALIGNANT NEOPLASM OF BREAST: ICD-10-CM

## 2021-02-15 DIAGNOSIS — Z00.00 ANNUAL PHYSICAL EXAM: Primary | ICD-10-CM

## 2021-02-15 LAB
ALBUMIN SERPL-MCNC: 3.8 G/DL (ref 3.4–5)
ALBUMIN UR-MCNC: NEGATIVE MG/DL
ALP SERPL-CCNC: 70 U/L (ref 40–150)
ALT SERPL W P-5'-P-CCNC: 21 U/L (ref 0–50)
ANION GAP SERPL CALCULATED.3IONS-SCNC: 4 MMOL/L (ref 3–14)
APPEARANCE UR: CLEAR
AST SERPL W P-5'-P-CCNC: 15 U/L (ref 0–45)
BACTERIA #/AREA URNS HPF: ABNORMAL /HPF
BASOPHILS # BLD AUTO: 0 10E9/L (ref 0–0.2)
BASOPHILS NFR BLD AUTO: 0.4 %
BILIRUB SERPL-MCNC: 0.5 MG/DL (ref 0.2–1.3)
BILIRUB UR QL STRIP: NEGATIVE
BUN SERPL-MCNC: 17 MG/DL (ref 7–30)
CALCIUM SERPL-MCNC: 9.2 MG/DL (ref 8.5–10.1)
CHLORIDE SERPL-SCNC: 104 MMOL/L (ref 94–109)
CHOLEST SERPL-MCNC: 237 MG/DL
CO2 SERPL-SCNC: 29 MMOL/L (ref 20–32)
COLOR UR AUTO: YELLOW
CREAT SERPL-MCNC: 0.9 MG/DL (ref 0.52–1.04)
DIFFERENTIAL METHOD BLD: NORMAL
EOSINOPHIL # BLD AUTO: 0.3 10E9/L (ref 0–0.7)
EOSINOPHIL NFR BLD AUTO: 4.1 %
ERYTHROCYTE [DISTWIDTH] IN BLOOD BY AUTOMATED COUNT: 13.8 % (ref 10–15)
GFR SERPL CREATININE-BSD FRML MDRD: 64 ML/MIN/{1.73_M2}
GLUCOSE SERPL-MCNC: 82 MG/DL (ref 70–99)
GLUCOSE UR STRIP-MCNC: NEGATIVE MG/DL
HCT VFR BLD AUTO: 44.4 % (ref 35–47)
HDLC SERPL-MCNC: 67 MG/DL
HGB BLD-MCNC: 15.3 G/DL (ref 11.7–15.7)
HGB UR QL STRIP: ABNORMAL
KETONES UR STRIP-MCNC: NEGATIVE MG/DL
LDLC SERPL CALC-MCNC: 142 MG/DL
LEUKOCYTE ESTERASE UR QL STRIP: ABNORMAL
LYMPHOCYTES # BLD AUTO: 2.6 10E9/L (ref 0.8–5.3)
LYMPHOCYTES NFR BLD AUTO: 36.6 %
MCH RBC QN AUTO: 30 PG (ref 26.5–33)
MCHC RBC AUTO-ENTMCNC: 34.5 G/DL (ref 31.5–36.5)
MCV RBC AUTO: 87 FL (ref 78–100)
MONOCYTES # BLD AUTO: 0.7 10E9/L (ref 0–1.3)
MONOCYTES NFR BLD AUTO: 10.4 %
NEUTROPHILS # BLD AUTO: 3.4 10E9/L (ref 1.6–8.3)
NEUTROPHILS NFR BLD AUTO: 48.5 %
NITRATE UR QL: NEGATIVE
NON-SQ EPI CELLS #/AREA URNS LPF: ABNORMAL /LPF
NONHDLC SERPL-MCNC: 170 MG/DL
PH UR STRIP: 6 PH (ref 5–7)
PLATELET # BLD AUTO: 233 10E9/L (ref 150–450)
POTASSIUM SERPL-SCNC: 4.1 MMOL/L (ref 3.4–5.3)
PROT SERPL-MCNC: 7.9 G/DL (ref 6.8–8.8)
RBC # BLD AUTO: 5.1 10E12/L (ref 3.8–5.2)
RBC #/AREA URNS AUTO: ABNORMAL /HPF
SODIUM SERPL-SCNC: 137 MMOL/L (ref 133–144)
SOURCE: ABNORMAL
SP GR UR STRIP: 1.02 (ref 1–1.03)
TRIGL SERPL-MCNC: 141 MG/DL
UROBILINOGEN UR STRIP-ACNC: 0.2 EU/DL (ref 0.2–1)
WBC # BLD AUTO: 7 10E9/L (ref 4–11)
WBC #/AREA URNS AUTO: ABNORMAL /HPF

## 2021-02-15 PROCEDURE — G0463 HOSPITAL OUTPT CLINIC VISIT: HCPCS

## 2021-02-15 PROCEDURE — 85025 COMPLETE CBC W/AUTO DIFF WBC: CPT | Mod: ZL,GZ | Performed by: NURSE PRACTITIONER

## 2021-02-15 PROCEDURE — 36415 COLL VENOUS BLD VENIPUNCTURE: CPT | Mod: ZL | Performed by: NURSE PRACTITIONER

## 2021-02-15 PROCEDURE — 99397 PER PM REEVAL EST PAT 65+ YR: CPT | Performed by: NURSE PRACTITIONER

## 2021-02-15 PROCEDURE — 81001 URINALYSIS AUTO W/SCOPE: CPT | Mod: ZL | Performed by: NURSE PRACTITIONER

## 2021-02-15 PROCEDURE — 80053 COMPREHEN METABOLIC PANEL: CPT | Mod: ZL | Performed by: NURSE PRACTITIONER

## 2021-02-15 PROCEDURE — 80061 LIPID PANEL: CPT | Mod: ZL,GZ | Performed by: NURSE PRACTITIONER

## 2021-02-15 ASSESSMENT — ANXIETY QUESTIONNAIRES
IF YOU CHECKED OFF ANY PROBLEMS ON THIS QUESTIONNAIRE, HOW DIFFICULT HAVE THESE PROBLEMS MADE IT FOR YOU TO DO YOUR WORK, TAKE CARE OF THINGS AT HOME, OR GET ALONG WITH OTHER PEOPLE: NOT DIFFICULT AT ALL
4. TROUBLE RELAXING: NOT AT ALL
3. WORRYING TOO MUCH ABOUT DIFFERENT THINGS: NOT AT ALL
2. NOT BEING ABLE TO STOP OR CONTROL WORRYING: NOT AT ALL
7. FEELING AFRAID AS IF SOMETHING AWFUL MIGHT HAPPEN: NOT AT ALL
1. FEELING NERVOUS, ANXIOUS, OR ON EDGE: NOT AT ALL
6. BECOMING EASILY ANNOYED OR IRRITABLE: NOT AT ALL
5. BEING SO RESTLESS THAT IT IS HARD TO SIT STILL: NOT AT ALL
GAD7 TOTAL SCORE: 0

## 2021-02-15 ASSESSMENT — MIFFLIN-ST. JEOR: SCORE: 1183.33

## 2021-02-15 ASSESSMENT — PATIENT HEALTH QUESTIONNAIRE - PHQ9: SUM OF ALL RESPONSES TO PHQ QUESTIONS 1-9: 0

## 2021-02-15 ASSESSMENT — PAIN SCALES - GENERAL: PAINLEVEL: NO PAIN (0)

## 2021-02-15 NOTE — NURSING NOTE
"Chief Complaint   Patient presents with     Physical       Initial /76   Pulse 68   Temp 98.1  F (36.7  C)   Ht 1.664 m (5' 5.5\")   Wt 66.5 kg (146 lb 8 oz)   SpO2 99%   BMI 24.01 kg/m   Estimated body mass index is 24.01 kg/m  as calculated from the following:    Height as of this encounter: 1.664 m (5' 5.5\").    Weight as of this encounter: 66.5 kg (146 lb 8 oz).  Medication Reconciliation: complete  Oralia Mak LPN  "

## 2021-02-15 NOTE — PATIENT INSTRUCTIONS
ASSESSMENT/PLAN:   1. Annual physical exam  - UA reflex to Microscopic and Culture - [place] [facility]  - Lipid Profile (Chol, Trig, HDL, LDL calc)  - Comprehensive metabolic panel  - CBC with platelets differential  - Urine Microscopic    2. Encounter for screening mammogram for malignant neoplasm of breast  - [facility] SCREENING DIGITAL BILATERAL ([place]); Future

## 2021-02-15 NOTE — RESULT ENCOUNTER NOTE
Lipids are a bit elevated - raising her risk score above 10%  Pls see if she would consider Lipitor 10 mg daily      The 10-year ASCVD risk score (Carlitos LINK Jr., et al., 2013) is: 10.2%    Values used to calculate the score:      Age: 70 years      Sex: Female      Is Non- : No      Diabetic: No      Tobacco smoker: No      Systolic Blood Pressure: 118 mmHg      Is BP treated: No      HDL Cholesterol: 67 mg/dL      Total Cholesterol: 237 mg/dL

## 2021-02-16 ASSESSMENT — ANXIETY QUESTIONNAIRES: GAD7 TOTAL SCORE: 0

## 2021-03-22 ENCOUNTER — ANCILLARY PROCEDURE (OUTPATIENT)
Dept: MAMMOGRAPHY | Facility: OTHER | Age: 71
End: 2021-03-22
Attending: NURSE PRACTITIONER
Payer: MEDICARE

## 2021-03-22 DIAGNOSIS — Z12.31 ENCOUNTER FOR SCREENING MAMMOGRAM FOR MALIGNANT NEOPLASM OF BREAST: ICD-10-CM

## 2021-03-22 PROCEDURE — 77063 BREAST TOMOSYNTHESIS BI: CPT | Mod: TC

## 2021-09-12 ENCOUNTER — HEALTH MAINTENANCE LETTER (OUTPATIENT)
Age: 71
End: 2021-09-12

## 2021-09-27 ENCOUNTER — TRANSFERRED RECORDS (OUTPATIENT)
Dept: HEALTH INFORMATION MANAGEMENT | Facility: CLINIC | Age: 71
End: 2021-09-27

## 2022-04-24 ENCOUNTER — HEALTH MAINTENANCE LETTER (OUTPATIENT)
Age: 72
End: 2022-04-24

## 2022-04-27 ENCOUNTER — ANCILLARY PROCEDURE (OUTPATIENT)
Dept: MAMMOGRAPHY | Facility: OTHER | Age: 72
End: 2022-04-27
Attending: NURSE PRACTITIONER
Payer: MEDICARE

## 2022-04-27 ENCOUNTER — TELEPHONE (OUTPATIENT)
Dept: MAMMOGRAPHY | Facility: OTHER | Age: 72
End: 2022-04-27

## 2022-04-27 DIAGNOSIS — Z12.31 VISIT FOR SCREENING MAMMOGRAM: ICD-10-CM

## 2022-04-27 PROCEDURE — 77067 SCR MAMMO BI INCL CAD: CPT | Mod: TC

## 2022-05-02 NOTE — PROGRESS NOTES
SUBJECTIVE:   Therese Medley is a 71 year old female who presents for Preventive Visit.      Patient has been advised of split billing requirements and indicates understanding: Yes  Are you in the first 12 months of your Medicare coverage?  No    Do you feel safe in your environment? Yes    Have you ever done Advance Care Planning? (For example, a Health Directive, POLST, or a discussion with a medical provider or your loved ones about your wishes): Yes, patient states has an Advance Care Planning document and will bring a copy to the clinic.       Fall risk  Fallen 2 or more times in the past year?: No  Any fall with injury in the past year?: No        Mini-CogTM Copyright LIANNE Petty. Licensed by the author for use in [facility]; reprinted with permission (sodonta@[email or web address]). All rights reserved.      Do you have sleep apnea, excessive snoring or daytime drowsiness?: no    Reviewed and updated as needed this visit by clinical staff   Tobacco   Meds                Reviewed and updated as needed this visit by Provider                   Social History     Tobacco Use     Smoking status: Never Smoker     Smokeless tobacco: Never Used   Substance Use Topics     Alcohol use: Yes     Comment: occasionally     If you drink alcohol do you typically have >3 drinks per day or >7 drinks per week? No    Alcohol Use 2/15/2021   Prescreen: >3 drinks/day or >7 drinks/week? No         Current providers sharing in care for this patient include:   Patient Care Team:  Jo-Ann Fitch CNP as PCP - General (Family Practice)  Jo-Ann Fitch CNP as Assigned PCP      The following health maintenance items are reviewed in Epic and correct as of today:  Health Maintenance Due   Topic Date Due     DTAP/TDAP/TD IMMUNIZATION (1 - Tdap) 09/05/2012     DEXA  10/27/2019     COVID-19 Vaccine (4 - Booster for Pfizer series) 02/14/2022     LIPID  02/15/2022     FALL RISK ASSESSMENT  02/15/2022       Lab work is in process      BP Readings  from Last 3 Encounters:   05/03/22 130/78   02/15/21 118/76   01/27/20 122/78    Wt Readings from Last 3 Encounters:   05/03/22 66.2 kg (146 lb)   02/15/21 66.5 kg (146 lb 8 oz)   01/27/20 66.2 kg (146 lb)                  Patient Active Problem List   Diagnosis     Advanced care planning/counseling discussion     Past Surgical History:   Procedure Laterality Date     COLONOSCOPY  2010    every 5 yrs     conization of cervix      pre-cancer cells     GYN SURGERY      total hyst     hysterectomy/complete, no cervix      large clots       Social History     Tobacco Use     Smoking status: Never Smoker     Smokeless tobacco: Never Used   Substance Use Topics     Alcohol use: Yes     Comment: occasionally     Family History   Problem Relation Age of Onset     Cancer Father         throat     Cerebrovascular Disease Father 73        cause of death     Alcohol/Drug Father      Breast Cancer Sister      Breast Cancer Mother      Hypertension Mother      Thyroid Disease Mother      Cancer Mother      Cancer Son         hodgkins disease     Cancer Sister         lung     Cancer - colorectal Brother 56        cause of death     Alcohol/Drug Brother      Cardiovascular Brother      Cardiovascular Brother      Cancer Brother      Cardiovascular Brother      Cardiovascular Brother      Asthma No family hx of      Diabetes No family hx of          No current outpatient medications on file.     Allergies   Allergen Reactions     Codeine Nausea and Vomiting     Recent Labs   Lab Test 02/15/21  0857 01/27/20  0943 01/05/18  1008 01/04/17  0946 11/04/15  0954 10/21/14  1119   * 151* 156*  --    < > 136*   HDL 67 65 70  --    < > 67   TRIG 141 148 143  --    < > 163*   ALT 21  --   --  24  --   --    CR 0.90  --  0.87  --   --   --    GFRESTIMATED 64  --  65  --   --   --    GFRESTBLACK 74  --  78  --   --   --    POTASSIUM 4.1  --  4.2  --   --   --    TSH  --   --   --  1.06  --  1.38    < > = values in this interval not  "displayed.            Review of Systems  Constitutional, HEENT, cardiovascular, pulmonary, GI, , musculoskeletal, neuro, skin, endocrine and psych systems are negative, except as otherwise noted.    OBJECTIVE:   /78 (BP Location: Left arm, Patient Position: Sitting, Cuff Size: Adult Regular)   Pulse 61   Temp 97.2  F (36.2  C) (Tympanic)   Resp 18   Ht 1.625 m (5' 3.98\")   Wt 66.2 kg (146 lb)   SpO2 98%   BMI 25.08 kg/m   Estimated body mass index is 25.08 kg/m  as calculated from the following:    Height as of this encounter: 1.625 m (5' 3.98\").    Weight as of this encounter: 66.2 kg (146 lb).       Physical Exam  GENERAL: healthy, alert and no distress  EYES: Eyes grossly normal to inspection, PERRL and conjunctivae and sclerae normal  HENT: ear canals and TM's normal, nose and mouth without ulcers or lesions  NECK: no adenopathy, no asymmetry, masses, or scars and thyroid normal to palpation  RESP: lungs clear to auscultation - no rales, rhonchi or wheezes  CV: regular rate and rhythm, normal S1 S2, no S3 or S4, no murmur, click or rub, no peripheral edema and peripheral pulses strong  ABDOMEN: soft, nontender, no hepatosplenomegaly, no masses and bowel sounds normal  MS: no gross musculoskeletal defects noted, no edema  SKIN: no suspicious lesions or rashes  PSYCH: mentation appears normal, affect normal/bright        ASSESSMENT / PLAN:       1. Routine history and physical examination of adult  - Comprehensive metabolic panel; Future  - CBC with platelets and differential; Future  - TSH with free T4 reflex; Future  - Lipid Profile (Chol, Trig, HDL, LDL calc); Future  - UA reflex to Microscopic and Culture - [facility]; Future    2. Special screening for malignant neoplasms, colon  - Adult General Surg Referral    3. Family history of colon cancer  - Adult General Surg Referral    4. Menopause present  - DX Hip/Pelvis/Spine; Future          COUNSELING:  Reviewed preventive health " "counseling, as reflected in patient instructions       Regular exercise       Healthy diet/nutrition       Vision screening    Estimated body mass index is 25.08 kg/m  as calculated from the following:    Height as of this encounter: 1.625 m (5' 3.98\").    Weight as of this encounter: 66.2 kg (146 lb).        She reports that she has never smoked. She has never used smokeless tobacco.      Appropriate preventive services were discussed with this patient, including applicable screening as appropriate for cardiovascular disease, diabetes, osteopenia/osteoporosis, and glaucoma.  As appropriate for age/gender, discussed screening for colorectal cancer, prostate cancer, breast cancer, and cervical cancer. Checklist reviewing preventive services available has been given to the patient.    Reviewed patients plan of care and provided an AVS. The Basic Care Plan (routine screening as documented in Health Maintenance) for Therese meets the Care Plan requirement. This Care Plan has been established and reviewed with the Patient.    Counseling Resources:  ATP IV Guidelines  Pooled Cohorts Equation Calculator  Breast Cancer Risk Calculator  Breast Cancer: Medication to Reduce Risk  FRAX Risk Assessment  ICSI Preventive Guidelines  Dietary Guidelines for Americans, 2010  USDA's MyPlate  ASA Prophylaxis  Lung CA Screening    Jo-Ann Fitch CNP  [facility]    Identified Health Risks:  "

## 2022-05-03 ENCOUNTER — OFFICE VISIT (OUTPATIENT)
Dept: FAMILY MEDICINE | Facility: OTHER | Age: 72
End: 2022-05-03
Attending: NURSE PRACTITIONER
Payer: COMMERCIAL

## 2022-05-03 ENCOUNTER — TELEPHONE (OUTPATIENT)
Dept: FAMILY MEDICINE | Facility: OTHER | Age: 72
End: 2022-05-03

## 2022-05-03 VITALS
TEMPERATURE: 97.2 F | HEART RATE: 61 BPM | BODY MASS INDEX: 24.92 KG/M2 | OXYGEN SATURATION: 98 % | RESPIRATION RATE: 18 BRPM | HEIGHT: 64 IN | WEIGHT: 146 LBS | DIASTOLIC BLOOD PRESSURE: 78 MMHG | SYSTOLIC BLOOD PRESSURE: 130 MMHG

## 2022-05-03 DIAGNOSIS — Z78.0 MENOPAUSE PRESENT: ICD-10-CM

## 2022-05-03 DIAGNOSIS — Z00.00 ROUTINE HISTORY AND PHYSICAL EXAMINATION OF ADULT: Primary | ICD-10-CM

## 2022-05-03 DIAGNOSIS — Z80.0 FAMILY HISTORY OF COLON CANCER: ICD-10-CM

## 2022-05-03 DIAGNOSIS — Z12.11 SPECIAL SCREENING FOR MALIGNANT NEOPLASMS, COLON: ICD-10-CM

## 2022-05-03 LAB
ALBUMIN SERPL-MCNC: 3.7 G/DL (ref 3.4–5)
ALBUMIN UR-MCNC: NEGATIVE MG/DL
ALP SERPL-CCNC: 62 U/L (ref 40–150)
ALT SERPL W P-5'-P-CCNC: 24 U/L (ref 0–50)
ANION GAP SERPL CALCULATED.3IONS-SCNC: 5 MMOL/L (ref 3–14)
APPEARANCE UR: CLEAR
AST SERPL W P-5'-P-CCNC: 21 U/L (ref 0–45)
BACTERIA #/AREA URNS HPF: ABNORMAL /HPF
BASOPHILS # BLD AUTO: 0 10E3/UL (ref 0–0.2)
BASOPHILS NFR BLD AUTO: 0 %
BILIRUB SERPL-MCNC: 0.6 MG/DL (ref 0.2–1.3)
BILIRUB UR QL STRIP: NEGATIVE
BUN SERPL-MCNC: 14 MG/DL (ref 7–30)
CALCIUM SERPL-MCNC: 8.6 MG/DL (ref 8.5–10.1)
CHLORIDE BLD-SCNC: 105 MMOL/L (ref 94–109)
CHOLEST SERPL-MCNC: 235 MG/DL
CO2 SERPL-SCNC: 28 MMOL/L (ref 20–32)
COLOR UR AUTO: YELLOW
CREAT SERPL-MCNC: 0.92 MG/DL (ref 0.52–1.04)
EOSINOPHIL # BLD AUTO: 0.3 10E3/UL (ref 0–0.7)
EOSINOPHIL NFR BLD AUTO: 4 %
ERYTHROCYTE [DISTWIDTH] IN BLOOD BY AUTOMATED COUNT: 14.1 % (ref 10–15)
FASTING STATUS PATIENT QL REPORTED: YES
GFR SERPL CREATININE-BSD FRML MDRD: 65 ML/MIN/1.73M2
GLUCOSE BLD-MCNC: 84 MG/DL (ref 70–99)
GLUCOSE UR STRIP-MCNC: NEGATIVE MG/DL
HCT VFR BLD AUTO: 44 % (ref 35–47)
HDLC SERPL-MCNC: 60 MG/DL
HGB BLD-MCNC: 15.4 G/DL (ref 11.7–15.7)
HGB UR QL STRIP: ABNORMAL
KETONES UR STRIP-MCNC: NEGATIVE MG/DL
LDLC SERPL CALC-MCNC: 142 MG/DL
LEUKOCYTE ESTERASE UR QL STRIP: NEGATIVE
LYMPHOCYTES # BLD AUTO: 3.1 10E3/UL (ref 0.8–5.3)
LYMPHOCYTES NFR BLD AUTO: 40 %
MCH RBC QN AUTO: 30.6 PG (ref 26.5–33)
MCHC RBC AUTO-ENTMCNC: 35 G/DL (ref 31.5–36.5)
MCV RBC AUTO: 87 FL (ref 78–100)
MONOCYTES # BLD AUTO: 0.8 10E3/UL (ref 0–1.3)
MONOCYTES NFR BLD AUTO: 10 %
NEUTROPHILS # BLD AUTO: 3.5 10E3/UL (ref 1.6–8.3)
NEUTROPHILS NFR BLD AUTO: 46 %
NITRATE UR QL: NEGATIVE
NONHDLC SERPL-MCNC: 175 MG/DL
PH UR STRIP: 6.5 [PH] (ref 5–7)
PLATELET # BLD AUTO: 222 10E3/UL (ref 150–450)
POTASSIUM BLD-SCNC: 4.2 MMOL/L (ref 3.4–5.3)
PROT SERPL-MCNC: 7.5 G/DL (ref 6.8–8.8)
RBC # BLD AUTO: 5.04 10E6/UL (ref 3.8–5.2)
RBC #/AREA URNS AUTO: ABNORMAL /HPF
SODIUM SERPL-SCNC: 138 MMOL/L (ref 133–144)
SP GR UR STRIP: <=1.005 (ref 1–1.03)
SQUAMOUS #/AREA URNS AUTO: ABNORMAL /LPF
TRIGL SERPL-MCNC: 165 MG/DL
TSH SERPL DL<=0.005 MIU/L-ACNC: 1.67 MU/L (ref 0.4–4)
UROBILINOGEN UR STRIP-ACNC: 0.2 E.U./DL
WBC # BLD AUTO: 7.6 10E3/UL (ref 4–11)
WBC #/AREA URNS AUTO: ABNORMAL /HPF

## 2022-05-03 PROCEDURE — 84443 ASSAY THYROID STIM HORMONE: CPT | Mod: ZL,GZ | Performed by: NURSE PRACTITIONER

## 2022-05-03 PROCEDURE — 80053 COMPREHEN METABOLIC PANEL: CPT | Mod: ZL | Performed by: NURSE PRACTITIONER

## 2022-05-03 PROCEDURE — 85025 COMPLETE CBC W/AUTO DIFF WBC: CPT | Mod: ZL | Performed by: NURSE PRACTITIONER

## 2022-05-03 PROCEDURE — G0439 PPPS, SUBSEQ VISIT: HCPCS | Performed by: NURSE PRACTITIONER

## 2022-05-03 PROCEDURE — 80061 LIPID PANEL: CPT | Mod: ZL | Performed by: NURSE PRACTITIONER

## 2022-05-03 PROCEDURE — 36415 COLL VENOUS BLD VENIPUNCTURE: CPT | Mod: ZL | Performed by: NURSE PRACTITIONER

## 2022-05-03 PROCEDURE — 81001 URINALYSIS AUTO W/SCOPE: CPT | Mod: ZL | Performed by: NURSE PRACTITIONER

## 2022-05-03 PROCEDURE — G0463 HOSPITAL OUTPT CLINIC VISIT: HCPCS

## 2022-05-03 PROCEDURE — 81003 URINALYSIS AUTO W/O SCOPE: CPT | Mod: ZL | Performed by: NURSE PRACTITIONER

## 2022-05-03 ASSESSMENT — PAIN SCALES - GENERAL: PAINLEVEL: NO PAIN (0)

## 2022-05-03 NOTE — RESULT ENCOUNTER NOTE
Stable labs  Lipids are high, risk score high  See if she would like to consider a low dose statin - Lipitor 20 mg daily  Low fat diet  Fish oil daily      The 10-year ASCVD risk score (Carlitos LINK Jr., et al., 2013) is: 13.7%    Values used to calculate the score:      Age: 71 years      Sex: Female      Is Non- : No      Diabetic: No      Tobacco smoker: No      Systolic Blood Pressure: 130 mmHg      Is BP treated: No      HDL Cholesterol: 60 mg/dL      Total Cholesterol: 235 mg/dL

## 2022-05-03 NOTE — TELEPHONE ENCOUNTER
Pt would like Jo-Ann to call her and let her know what she thinks about getting the second booster shot. She stated she forgot to ask during her appt this morning. She would like you to call her at [phone] when you get a chance today.

## 2022-05-03 NOTE — NURSING NOTE
"Chief Complaint   Patient presents with     Wellness Visit       Initial /78 (BP Location: Left arm, Patient Position: Sitting, Cuff Size: Adult Regular)   Pulse 61   Temp 97.2  F (36.2  C) (Tympanic)   Resp 18   Ht 1.625 m (5' 3.98\")   Wt 66.2 kg (146 lb)   SpO2 98%   BMI 25.08 kg/m   Estimated body mass index is 25.08 kg/m  as calculated from the following:    Height as of this encounter: 1.625 m (5' 3.98\").    Weight as of this encounter: 66.2 kg (146 lb).  Medication Reconciliation: complete  Odalys Gann LPN  "

## 2022-05-03 NOTE — PATIENT INSTRUCTIONS
ASSESSMENT / PLAN:       1. Routine history and physical examination of adult  - Comprehensive metabolic panel; Future  - CBC with platelets and differential; Future  - TSH with free T4 reflex; Future  - Lipid Profile (Chol, Trig, HDL, LDL calc); Future  - UA reflex to Microscopic and Culture - [place] [facility]; Future    2. Special screening for malignant neoplasms, colon  - Adult General Surg Referral    3. Family history of colon cancer  - Adult General Surg Referral    4. Menopause present  - DX Hip/Pelvis/Spine; Future

## 2022-05-10 ENCOUNTER — HOSPITAL ENCOUNTER (OUTPATIENT)
Dept: BONE DENSITY | Facility: HOSPITAL | Age: 72
Discharge: HOME | End: 2022-05-10
Attending: NURSE PRACTITIONER | Admitting: NURSE PRACTITIONER
Payer: MEDICARE

## 2022-05-10 DIAGNOSIS — Z78.0 MENOPAUSE PRESENT: ICD-10-CM

## 2022-05-10 PROCEDURE — 77080 DXA BONE DENSITY AXIAL: CPT

## 2022-11-19 ENCOUNTER — HEALTH MAINTENANCE LETTER (OUTPATIENT)
Age: 72
End: 2022-11-19

## 2023-05-22 ENCOUNTER — OFFICE VISIT (OUTPATIENT)
Dept: FAMILY MEDICINE | Facility: OTHER | Age: 73
End: 2023-05-22
Attending: NURSE PRACTITIONER
Payer: COMMERCIAL

## 2023-05-22 VITALS
RESPIRATION RATE: 17 BRPM | TEMPERATURE: 98.2 F | SYSTOLIC BLOOD PRESSURE: 130 MMHG | HEART RATE: 71 BPM | OXYGEN SATURATION: 98 % | BODY MASS INDEX: 24.52 KG/M2 | DIASTOLIC BLOOD PRESSURE: 78 MMHG | WEIGHT: 143.6 LBS | HEIGHT: 64 IN

## 2023-05-22 DIAGNOSIS — Z00.00 ROUTINE HISTORY AND PHYSICAL EXAMINATION OF ADULT: Primary | ICD-10-CM

## 2023-05-22 DIAGNOSIS — Z12.11 SPECIAL SCREENING FOR MALIGNANT NEOPLASMS, COLON: ICD-10-CM

## 2023-05-22 LAB
ALBUMIN SERPL BCG-MCNC: 4.2 G/DL (ref 3.5–5.2)
ALBUMIN UR-MCNC: NEGATIVE MG/DL
ALP SERPL-CCNC: 71 U/L (ref 35–104)
ALT SERPL W P-5'-P-CCNC: 23 U/L (ref 10–35)
ANION GAP SERPL CALCULATED.3IONS-SCNC: 12 MMOL/L (ref 7–15)
APPEARANCE UR: CLEAR
AST SERPL W P-5'-P-CCNC: 26 U/L (ref 10–35)
BACTERIA #/AREA URNS HPF: ABNORMAL /HPF
BASOPHILS # BLD AUTO: 0 10E3/UL (ref 0–0.2)
BASOPHILS NFR BLD AUTO: 0 %
BILIRUB SERPL-MCNC: 0.6 MG/DL
BILIRUB UR QL STRIP: NEGATIVE
BUN SERPL-MCNC: 15.2 MG/DL (ref 8–23)
CALCIUM SERPL-MCNC: 9.6 MG/DL (ref 8.8–10.2)
CHLORIDE SERPL-SCNC: 103 MMOL/L (ref 98–107)
CHOLEST SERPL-MCNC: 232 MG/DL
COLOR UR AUTO: YELLOW
CREAT SERPL-MCNC: 0.97 MG/DL (ref 0.51–0.95)
DEPRECATED HCO3 PLAS-SCNC: 26 MMOL/L (ref 22–29)
EOSINOPHIL # BLD AUTO: 0.3 10E3/UL (ref 0–0.7)
EOSINOPHIL NFR BLD AUTO: 4 %
ERYTHROCYTE [DISTWIDTH] IN BLOOD BY AUTOMATED COUNT: 14.9 % (ref 10–15)
GFR SERPL CREATININE-BSD FRML MDRD: 61 ML/MIN/1.73M2
GLUCOSE SERPL-MCNC: 88 MG/DL (ref 70–99)
GLUCOSE UR STRIP-MCNC: NEGATIVE MG/DL
HCT VFR BLD AUTO: 47.3 % (ref 35–47)
HDLC SERPL-MCNC: 60 MG/DL
HGB BLD-MCNC: 16.4 G/DL (ref 11.7–15.7)
HGB UR QL STRIP: ABNORMAL
KETONES UR STRIP-MCNC: NEGATIVE MG/DL
LDLC SERPL CALC-MCNC: 143 MG/DL
LEUKOCYTE ESTERASE UR QL STRIP: ABNORMAL
LYMPHOCYTES # BLD AUTO: 2.8 10E3/UL (ref 0.8–5.3)
LYMPHOCYTES NFR BLD AUTO: 39 %
MCH RBC QN AUTO: 30.4 PG (ref 26.5–33)
MCHC RBC AUTO-ENTMCNC: 34.7 G/DL (ref 31.5–36.5)
MCV RBC AUTO: 88 FL (ref 78–100)
MONOCYTES # BLD AUTO: 0.6 10E3/UL (ref 0–1.3)
MONOCYTES NFR BLD AUTO: 9 %
MUCOUS THREADS #/AREA URNS LPF: PRESENT /LPF
NEUTROPHILS # BLD AUTO: 3.4 10E3/UL (ref 1.6–8.3)
NEUTROPHILS NFR BLD AUTO: 49 %
NITRATE UR QL: NEGATIVE
NONHDLC SERPL-MCNC: 172 MG/DL
PH UR STRIP: 5.5 [PH] (ref 5–7)
PLATELET # BLD AUTO: 248 10E3/UL (ref 150–450)
POTASSIUM SERPL-SCNC: 4.4 MMOL/L (ref 3.4–5.3)
PROT SERPL-MCNC: 7.5 G/DL (ref 6.4–8.3)
RBC # BLD AUTO: 5.39 10E6/UL (ref 3.8–5.2)
RBC #/AREA URNS AUTO: ABNORMAL /HPF
SODIUM SERPL-SCNC: 141 MMOL/L (ref 136–145)
SP GR UR STRIP: 1.02 (ref 1–1.03)
SQUAMOUS #/AREA URNS AUTO: ABNORMAL /LPF
TRIGL SERPL-MCNC: 143 MG/DL
TSH SERPL DL<=0.005 MIU/L-ACNC: 1.83 UIU/ML (ref 0.3–4.2)
UROBILINOGEN UR STRIP-ACNC: 0.2 E.U./DL
WBC # BLD AUTO: 7.1 10E3/UL (ref 4–11)
WBC #/AREA URNS AUTO: ABNORMAL /HPF

## 2023-05-22 PROCEDURE — 81003 URINALYSIS AUTO W/O SCOPE: CPT | Mod: ZL | Performed by: NURSE PRACTITIONER

## 2023-05-22 PROCEDURE — 84443 ASSAY THYROID STIM HORMONE: CPT | Mod: ZL,GZ | Performed by: NURSE PRACTITIONER

## 2023-05-22 PROCEDURE — 85004 AUTOMATED DIFF WBC COUNT: CPT | Mod: ZL | Performed by: NURSE PRACTITIONER

## 2023-05-22 PROCEDURE — 80061 LIPID PANEL: CPT | Mod: ZL | Performed by: NURSE PRACTITIONER

## 2023-05-22 PROCEDURE — G0463 HOSPITAL OUTPT CLINIC VISIT: HCPCS

## 2023-05-22 PROCEDURE — 82310 ASSAY OF CALCIUM: CPT | Mod: ZL | Performed by: NURSE PRACTITIONER

## 2023-05-22 PROCEDURE — 81001 URINALYSIS AUTO W/SCOPE: CPT | Mod: ZL | Performed by: NURSE PRACTITIONER

## 2023-05-22 PROCEDURE — 36415 COLL VENOUS BLD VENIPUNCTURE: CPT | Mod: ZL | Performed by: NURSE PRACTITIONER

## 2023-05-22 PROCEDURE — G0439 PPPS, SUBSEQ VISIT: HCPCS | Performed by: NURSE PRACTITIONER

## 2023-05-22 ASSESSMENT — ACTIVITIES OF DAILY LIVING (ADL): CURRENT_FUNCTION: NO ASSISTANCE NEEDED

## 2023-05-22 ASSESSMENT — ENCOUNTER SYMPTOMS
HEMATURIA: 0
DIZZINESS: 0
FREQUENCY: 0
PARESTHESIAS: 0
WEAKNESS: 0
JOINT SWELLING: 0
NERVOUS/ANXIOUS: 0
EYE PAIN: 0
SHORTNESS OF BREATH: 0
SORE THROAT: 0
CHILLS: 0
HEARTBURN: 0
DYSURIA: 0
CONSTIPATION: 0
ARTHRALGIAS: 0
HEADACHES: 0
COUGH: 0
ABDOMINAL PAIN: 0
DIARRHEA: 0
BREAST MASS: 0
PALPITATIONS: 0
MYALGIAS: 0
NAUSEA: 0
HEMATOCHEZIA: 0
FEVER: 0

## 2023-05-22 ASSESSMENT — PAIN SCALES - GENERAL: PAINLEVEL: NO PAIN (0)

## 2023-05-22 NOTE — PROGRESS NOTES
"SUBJECTIVE:   Therese is a 72 year old who presents for Preventive Visit.        Patient has been advised of split billing requirements and indicates understanding: Yes       Are you in the first 12 months of your Medicare coverage?  No        Healthy Habits:    In general, how would you rate your overall health?  Excellent    Frequency of exercise:  4-5 days/week    Duration of exercise:  30-45 minutes    Do you usually eat at least 4 servings of fruit and vegetables a day, include whole grains    & fiber and avoid regularly eating high fat or \"junk\" foods?  Yes    Taking medications regularly:  Yes    Medication side effects:  Not applicable    Ability to successfully perform activities of daily living:  No assistance needed    Home Safety:  No safety concerns identified    Hearing Impairment:  No hearing concerns    In the past 6 months, have you been bothered by leaking of urine?  No    In general, how would you rate your overall mental or emotional health?  Good      PHQ-2 Total Score:    Additional concerns today:  No          Have you ever done Advance Care Planning? (For example, a Health Directive, POLST, or a discussion with a medical provider or your loved ones about your wishes): Yes, patient states has an Advance Care Planning document and will bring a copy to the clinic.          Fall risk  Fallen 2 or more times in the past year?: No  Any fall with injury in the past year?: No        Reviewed and updated as needed this visit by clinical staff   Tobacco  Allergies  Meds              Reviewed and updated as needed this visit by Provider                 Social History     Tobacco Use     Smoking status: Never     Smokeless tobacco: Never   Vaping Use     Vaping status: Never Used   Substance Use Topics     Alcohol use: Yes     Comment: occasionally               5/22/2023     8:39 AM   Alcohol Use   Prescreen: >3 drinks/day or >7 drinks/week? No        Do you have a current opioid prescription? " No  Do you use any other controlled substances or medications that are not prescribed by a provider? None        Current providers sharing in care for this patient include:   Patient Care Team:  Jo-Ann Fitch CNP as PCP - General (Family Practice)  Jo-Ann Fitch CNP as Assigned PCP        The following health maintenance items are reviewed in Epic and correct as of today:        Health Maintenance   Topic Date Due     DTAP/TDAP/TD IMMUNIZATION (2 - Td or Tdap) 09/05/2022     LIPID  05/03/2023     MAMMO SCREENING  04/27/2023     COVID-19 Vaccine (6 - Pfizer series) 08/12/2023     MEDICARE ANNUAL WELLNESS VISIT  05/22/2024     FALL RISK ASSESSMENT  05/22/2024     COLORECTAL CANCER SCREENING  04/08/2026     DEXA  05/10/2027     ADVANCE CARE PLANNING  05/22/2028     HEPATITIS C SCREENING  Completed     PHQ-2 (once per calendar year)  Completed     INFLUENZA VACCINE  Completed     Pneumococcal Vaccine: 65+ Years  Completed     ZOSTER IMMUNIZATION  Completed     IPV IMMUNIZATION  Aged Out     MENINGITIS IMMUNIZATION  Aged Out     Lab work is in process  Labs reviewed in EPIC  BP Readings from Last 3 Encounters:   05/22/23 130/78   05/03/22 130/78   02/15/21 118/76    Wt Readings from Last 3 Encounters:   05/22/23 65.1 kg (143 lb 9.6 oz)   05/03/22 66.2 kg (146 lb)   02/15/21 66.5 kg (146 lb 8 oz)                  Patient Active Problem List   Diagnosis     Advanced care planning/counseling discussion     Past Surgical History:   Procedure Laterality Date     COLONOSCOPY  2010    every 5 yrs     conization of cervix      pre-cancer cells     GYN SURGERY      total hyst     hysterectomy/complete, no cervix      large clots       Social History     Tobacco Use     Smoking status: Never     Smokeless tobacco: Never   Vaping Use     Vaping status: Never Used   Substance Use Topics     Alcohol use: Yes     Comment: occasionally     Family History   Problem Relation Age of Onset     Cancer Father         throat     Cerebrovascular  "Disease Father 73        cause of death     Alcohol/Drug Father      Breast Cancer Sister      Breast Cancer Mother      Hypertension Mother      Thyroid Disease Mother      Cancer Mother      Cancer Son         hodgkins disease     Cancer Sister         lung     Cancer - colorectal Brother 56        cause of death     Alcohol/Drug Brother      Cardiovascular Brother      Cardiovascular Brother      Cancer Brother      Cardiovascular Brother      Cardiovascular Brother      Asthma No family hx of      Diabetes No family hx of          No current outpatient medications on file.     Allergies   Allergen Reactions     Codeine Nausea and Vomiting     Recent Labs   Lab Test 05/03/22  0857 02/15/21  0857 01/27/20  0943 01/05/18  1008 01/04/17  0946 11/04/15  0954   * 142* 151* 156*  --   --    HDL 60 67 65 70  --   --    TRIG 165* 141 148 143  --   --    ALT 24 21  --   --  24  --    CR 0.92 0.90  --  0.87  --    < >   GFRESTIMATED 65 64  --  65  --    < >   GFRESTBLACK  --  74  --  78  --   --    POTASSIUM 4.2 4.1  --  4.2  --    < >   TSH 1.67  --   --   --  1.06  --     < > = values in this interval not displayed.          Review of Systems  Constitutional, HEENT, cardiovascular, pulmonary, GI, , musculoskeletal, neuro, skin, endocrine and psych systems are negative, except as otherwise noted.        OBJECTIVE:   /78 (BP Location: Left arm, Patient Position: Sitting, Cuff Size: Adult Regular)   Pulse 71   Temp 98.2  F (36.8  C) (Tympanic)   Resp 17   Ht 1.623 m (5' 3.9\")   Wt 65.1 kg (143 lb 9.6 oz)   SpO2 98%   BMI 24.73 kg/m   Estimated body mass index is 24.73 kg/m  as calculated from the following:    Height as of this encounter: 1.623 m (5' 3.9\").    Weight as of this encounter: 65.1 kg (143 lb 9.6 oz).         Physical Exam  GENERAL: healthy, alert and no distress  EYES: Eyes grossly normal to inspection, PERRL and conjunctivae and sclerae normal  HENT: ear canals and TM's normal, nose and " mouth without ulcers or lesions  NECK: no adenopathy, no asymmetry, masses, or scars and thyroid normal to palpation  RESP: lungs clear to auscultation - no rales, rhonchi or wheezes  CV: regular rate and rhythm, normal S1 S2, no S3 or S4, no murmur, click or rub, no peripheral edema and peripheral pulses strong  MS: no gross musculoskeletal defects noted, no edema  SKIN: no suspicious lesions or rashes  PSYCH: mentation appears normal, affect normal/bright      Results for orders placed or performed in visit on 05/22/23   Lipid Profile (Chol, Trig, HDL, LDL calc)     Status: Abnormal   Result Value Ref Range    Cholesterol 232 (H) <200 mg/dL    Triglycerides 143 <150 mg/dL    Direct Measure HDL 60 >=50 mg/dL    LDL Cholesterol Calculated 143 (H) <=100 mg/dL    Non HDL Cholesterol 172 (H) <130 mg/dL    Narrative    Cholesterol  Desirable:  <200 mg/dL    Triglycerides  Normal:  Less than 150 mg/dL  Borderline High:  150-199 mg/dL  High:  200-499 mg/dL  Very High:  Greater than or equal to 500 mg/dL    Direct Measure HDL  Female:  Greater than or equal to 50 mg/dL   Male:  Greater than or equal to 40 mg/dL    LDL Cholesterol  Desirable:  <100mg/dL  Above Desirable:  100-129 mg/dL   Borderline High:  130-159 mg/dL   High:  160-189 mg/dL   Very High:  >= 190 mg/dL    Non HDL Cholesterol  Desirable:  130 mg/dL  Above Desirable:  130-159 mg/dL  Borderline High:  160-189 mg/dL  High:  190-219 mg/dL  Very High:  Greater than or equal to 220 mg/dL   Comprehensive metabolic panel     Status: Abnormal   Result Value Ref Range    Sodium 141 136 - 145 mmol/L    Potassium 4.4 3.4 - 5.3 mmol/L    Chloride 103 98 - 107 mmol/L    Carbon Dioxide (CO2) 26 22 - 29 mmol/L    Anion Gap 12 7 - 15 mmol/L    Urea Nitrogen 15.2 8.0 - 23.0 mg/dL    Creatinine 0.97 (H) 0.51 - 0.95 mg/dL    Calcium 9.6 8.8 - 10.2 mg/dL    Glucose 88 70 - 99 mg/dL    Alkaline Phosphatase 71 35 - 104 U/L    AST 26 10 - 35 U/L    ALT 23 10 - 35 U/L    Protein  Total 7.5 6.4 - 8.3 g/dL    Albumin 4.2 3.5 - 5.2 g/dL    Bilirubin Total 0.6 <=1.2 mg/dL    GFR Estimate 61 >60 mL/min/1.73m2   TSH with free T4 reflex     Status: Normal   Result Value Ref Range    TSH 1.83 0.30 - 4.20 uIU/mL   CBC with platelets and differential     Status: Abnormal   Result Value Ref Range    WBC Count 7.1 4.0 - 11.0 10e3/uL    RBC Count 5.39 (H) 3.80 - 5.20 10e6/uL    Hemoglobin 16.4 (H) 11.7 - 15.7 g/dL    Hematocrit 47.3 (H) 35.0 - 47.0 %    MCV 88 78 - 100 fL    MCH 30.4 26.5 - 33.0 pg    MCHC 34.7 31.5 - 36.5 g/dL    RDW 14.9 10.0 - 15.0 %    Platelet Count 248 150 - 450 10e3/uL    % Neutrophils 49 %    % Lymphocytes 39 %    % Monocytes 9 %    % Eosinophils 4 %    % Basophils 0 %    Absolute Neutrophils 3.4 1.6 - 8.3 10e3/uL    Absolute Lymphocytes 2.8 0.8 - 5.3 10e3/uL    Absolute Monocytes 0.6 0.0 - 1.3 10e3/uL    Absolute Eosinophils 0.3 0.0 - 0.7 10e3/uL    Absolute Basophils 0.0 0.0 - 0.2 10e3/uL   UA Macroscopic with reflex to Microscopic and Culture     Status: Abnormal    Specimen: Urine, Midstream   Result Value Ref Range    Color Urine Yellow Colorless, Straw, Light Yellow, Yellow    Appearance Urine Clear Clear    Glucose Urine Negative Negative mg/dL    Bilirubin Urine Negative Negative    Ketones Urine Negative Negative mg/dL    Specific Gravity Urine 1.025 1.003 - 1.035    Blood Urine Trace (A) Negative    pH Urine 5.5 5.0 - 7.0    Protein Albumin Urine Negative Negative mg/dL    Urobilinogen Urine 0.2 0.2, 1.0 E.U./dL    Nitrite Urine Negative Negative    Leukocyte Esterase Urine Trace (A) Negative   UA Microscopic with Reflex to Culture     Status: Abnormal   Result Value Ref Range    Bacteria Urine Few (A) None Seen /HPF    RBC Urine 0-2 0-2 /HPF /HPF    WBC Urine 0-5 0-5 /HPF /HPF    Squamous Epithelials Urine Few (A) None Seen /LPF    Mucus Urine Present (A) None Seen /LPF    Narrative    Urine Culture not indicated   CBC with platelets and differential     Status:  Abnormal    Narrative    The following orders were created for panel order CBC with platelets and differential.  Procedure                               Abnormality         Status                     ---------                               -----------         ------                     CBC with platelets and d...[[id number]]  Abnormal            Final result                 Please view results for these tests on the individual orders.         ASSESSMENT / PLAN:         1. Routine history and physical examination of adult  - UA Macroscopic with reflex to Microscopic and Culture; Future  - Lipid Profile (Chol, Trig, HDL, LDL calc)  - Comprehensive metabolic panel  - CBC with platelets and differential  - TSH with free T4 reflex        General surgery referral sent to [facility] for colonoscopy          COUNSELING:  Reviewed preventive health counseling, as reflected in patient instructions       Regular exercise       Healthy diet/nutrition       Vision screening          She reports that she has never smoked. She has never used smokeless tobacco.        Appropriate preventive services were discussed with this patient, including applicable screening as appropriate for cardiovascular disease, diabetes, osteopenia/osteoporosis, and glaucoma.  As appropriate for age/gender, discussed screening for colorectal cancer, prostate cancer, breast cancer, and cervical cancer. Checklist reviewing preventive services available has been given to the patient.        Reviewed patients plan of care and provided an AVS. The Basic Care Plan (routine screening as documented in Health Maintenance) for Therese meets the Care Plan requirement. This Care Plan has been established and reviewed with the Patient.        Jo-Ann Fitch, CNP  [facility]

## 2023-05-22 NOTE — PATIENT INSTRUCTIONS
1. Routine history and physical examination of adult  - UA Macroscopic with reflex to Microscopic and Culture; Future  - Lipid Profile (Chol, Trig, HDL, LDL calc)  - Comprehensive metabolic panel  - CBC with platelets and differential  - TSH with free T4 reflex        General surgery referral sent to [facility] for colonoscopy          Preventive Health Recommendations    See your health care provider every year to  Review health changes.   Discuss preventive care.    Review your medicines if your doctor has prescribed any.    You no longer need a yearly Pap test unless you've had an abnormal Pap test in the past 10 years. If you have vaginal symptoms, such as bleeding or discharge, be sure to talk with your provider about a Pap test.    Every 1 to 2 years, have a mammogram.  If you are over 69, talk with your health care provider about whether or not you want to continue having screening mammograms.    Every 10 years, have a colonoscopy. Or, have a yearly FIT test (stool test). These exams will check for colon cancer.     Have a cholesterol test every 5 years, or more often if your doctor advises it.     Have a diabetes test (fasting glucose) every three years. If you are at risk for diabetes, you should have this test more often.     At age 65, have a bone density scan (DEXA) to check for osteoporosis (brittle bone disease).    Shots:  Get a flu shot each year.  Get a tetanus shot every 10 years.  Talk to your doctor about your pneumonia vaccines. There are now two you should receive - Pneumovax (PPSV 23) and Prevnar (PCV 13).  Talk to your pharmacist about the shingles vaccine.  Talk to your doctor about the hepatitis B vaccine.    Nutrition:   Eat at least 5 servings of fruits and vegetables each day.    Eat whole-grain bread, whole-wheat pasta and brown rice instead of white grains and rice.    Get adequate about Calcium and Vitamin D.     Lifestyle  Exercise at least 150 minutes a week (30 minutes a  day, 5 days a week). This will help you control your weight and prevent disease.    Limit alcohol to one drink per day.    No smoking.     Wear sunscreen to prevent skin cancer.     See your dentist twice a year for an exam and cleaning.    See your eye doctor every 1 to 2 years to screen for conditions such as glaucoma, macular degeneration, cataracts, etc.    Personalized Prevention Plan  You are due for the preventive services outlined below.  Your care team is available to assist you in scheduling these services.  If you have already completed any of these items, please share that information with your care team to update in your medical record.    Health Maintenance Due   Topic Date Due    Diptheria Tetanus Pertussis (DTAP/TDAP/TD) Vaccine (2 - Td or Tdap) 09/05/2022    Cholesterol Lab  05/03/2023    Mammogram  04/27/2023

## 2023-06-02 ENCOUNTER — TELEPHONE (OUTPATIENT)
Dept: MAMMOGRAPHY | Facility: OTHER | Age: 73
End: 2023-06-02

## 2023-06-02 ENCOUNTER — ANCILLARY PROCEDURE (OUTPATIENT)
Dept: MAMMOGRAPHY | Facility: OTHER | Age: 73
End: 2023-06-02
Attending: NURSE PRACTITIONER
Payer: MEDICARE

## 2023-06-02 DIAGNOSIS — Z12.31 VISIT FOR SCREENING MAMMOGRAM: ICD-10-CM

## 2023-06-02 PROCEDURE — 77067 SCR MAMMO BI INCL CAD: CPT | Mod: TC

## 2023-10-10 ENCOUNTER — TRANSFERRED RECORDS (OUTPATIENT)
Dept: HEALTH INFORMATION MANAGEMENT | Facility: CLINIC | Age: 73
End: 2023-10-10

## 2023-10-12 ENCOUNTER — TRANSFERRED RECORDS (OUTPATIENT)
Dept: HEALTH INFORMATION MANAGEMENT | Facility: CLINIC | Age: 73
End: 2023-10-12

## 2024-05-28 ENCOUNTER — OFFICE VISIT (OUTPATIENT)
Dept: FAMILY MEDICINE | Facility: OTHER | Age: 74
End: 2024-05-28
Attending: NURSE PRACTITIONER
Payer: COMMERCIAL

## 2024-05-28 VITALS
TEMPERATURE: 98.1 F | SYSTOLIC BLOOD PRESSURE: 130 MMHG | WEIGHT: 141.09 LBS | OXYGEN SATURATION: 98 % | HEIGHT: 64 IN | BODY MASS INDEX: 24.09 KG/M2 | RESPIRATION RATE: 18 BRPM | HEART RATE: 67 BPM | DIASTOLIC BLOOD PRESSURE: 80 MMHG

## 2024-05-28 DIAGNOSIS — Z12.31 ENCOUNTER FOR SCREENING MAMMOGRAM FOR MALIGNANT NEOPLASM OF BREAST: ICD-10-CM

## 2024-05-28 DIAGNOSIS — N28.9 ABNORMAL RENAL FUNCTION: Primary | ICD-10-CM

## 2024-05-28 DIAGNOSIS — Z00.00 ROUTINE HISTORY AND PHYSICAL EXAMINATION OF ADULT: Primary | ICD-10-CM

## 2024-05-28 LAB
ALBUMIN SERPL BCG-MCNC: 4.2 G/DL (ref 3.5–5.2)
ALBUMIN UR-MCNC: NEGATIVE MG/DL
ALP SERPL-CCNC: 70 U/L (ref 40–150)
ALT SERPL W P-5'-P-CCNC: 14 U/L (ref 0–50)
ANION GAP SERPL CALCULATED.3IONS-SCNC: 10 MMOL/L (ref 7–15)
APPEARANCE UR: CLEAR
AST SERPL W P-5'-P-CCNC: 19 U/L (ref 0–45)
BACTERIA #/AREA URNS HPF: ABNORMAL /HPF
BASOPHILS # BLD AUTO: 0 10E3/UL (ref 0–0.2)
BASOPHILS NFR BLD AUTO: 1 %
BILIRUB SERPL-MCNC: 0.4 MG/DL
BILIRUB UR QL STRIP: NEGATIVE
BUN SERPL-MCNC: 17.8 MG/DL (ref 8–23)
CALCIUM SERPL-MCNC: 9.5 MG/DL (ref 8.8–10.2)
CHLORIDE SERPL-SCNC: 104 MMOL/L (ref 98–107)
CHOLEST SERPL-MCNC: 231 MG/DL
COLOR UR AUTO: YELLOW
CREAT SERPL-MCNC: 1.04 MG/DL (ref 0.51–0.95)
DEPRECATED HCO3 PLAS-SCNC: 27 MMOL/L (ref 22–29)
EGFRCR SERPLBLD CKD-EPI 2021: 56 ML/MIN/1.73M2
EOSINOPHIL # BLD AUTO: 0.3 10E3/UL (ref 0–0.7)
EOSINOPHIL NFR BLD AUTO: 4 %
ERYTHROCYTE [DISTWIDTH] IN BLOOD BY AUTOMATED COUNT: 14.4 % (ref 10–15)
FASTING STATUS PATIENT QL REPORTED: YES
FASTING STATUS PATIENT QL REPORTED: YES
GLUCOSE SERPL-MCNC: 86 MG/DL (ref 70–99)
GLUCOSE UR STRIP-MCNC: NEGATIVE MG/DL
HCT VFR BLD AUTO: 45.9 % (ref 35–47)
HDLC SERPL-MCNC: 61 MG/DL
HGB BLD-MCNC: 15.7 G/DL (ref 11.7–15.7)
HGB UR QL STRIP: ABNORMAL
IMM GRANULOCYTES # BLD: 0 10E3/UL
IMM GRANULOCYTES NFR BLD: 0 %
KETONES UR STRIP-MCNC: NEGATIVE MG/DL
LDLC SERPL CALC-MCNC: 134 MG/DL
LEUKOCYTE ESTERASE UR QL STRIP: ABNORMAL
LYMPHOCYTES # BLD AUTO: 2.6 10E3/UL (ref 0.8–5.3)
LYMPHOCYTES NFR BLD AUTO: 36 %
MCH RBC QN AUTO: 29.2 PG (ref 26.5–33)
MCHC RBC AUTO-ENTMCNC: 34.2 G/DL (ref 31.5–36.5)
MCV RBC AUTO: 86 FL (ref 78–100)
MONOCYTES # BLD AUTO: 0.6 10E3/UL (ref 0–1.3)
MONOCYTES NFR BLD AUTO: 9 %
MUCOUS THREADS #/AREA URNS LPF: PRESENT /LPF
NEUTROPHILS # BLD AUTO: 3.8 10E3/UL (ref 1.6–8.3)
NEUTROPHILS NFR BLD AUTO: 51 %
NITRATE UR QL: NEGATIVE
NONHDLC SERPL-MCNC: 170 MG/DL
NRBC # BLD AUTO: 0 10E3/UL
NRBC BLD AUTO-RTO: 0 /100
PH UR STRIP: 5.5 [PH] (ref 5–7)
PLATELET # BLD AUTO: 220 10E3/UL (ref 150–450)
POTASSIUM SERPL-SCNC: 4.1 MMOL/L (ref 3.4–5.3)
PROT SERPL-MCNC: 7.3 G/DL (ref 6.4–8.3)
RBC # BLD AUTO: 5.37 10E6/UL (ref 3.8–5.2)
RBC #/AREA URNS AUTO: ABNORMAL /HPF
SODIUM SERPL-SCNC: 141 MMOL/L (ref 135–145)
SP GR UR STRIP: 1.02 (ref 1–1.03)
SQUAMOUS #/AREA URNS AUTO: ABNORMAL /LPF
TRIGL SERPL-MCNC: 178 MG/DL
TSH SERPL DL<=0.005 MIU/L-ACNC: 1.53 UIU/ML (ref 0.3–4.2)
UROBILINOGEN UR STRIP-ACNC: 0.2 E.U./DL
WBC # BLD AUTO: 7.4 10E3/UL (ref 4–11)
WBC #/AREA URNS AUTO: ABNORMAL /HPF

## 2024-05-28 PROCEDURE — 81003 URINALYSIS AUTO W/O SCOPE: CPT | Mod: ZL | Performed by: NURSE PRACTITIONER

## 2024-05-28 PROCEDURE — 80061 LIPID PANEL: CPT | Mod: ZL | Performed by: NURSE PRACTITIONER

## 2024-05-28 PROCEDURE — 85004 AUTOMATED DIFF WBC COUNT: CPT | Mod: ZL | Performed by: NURSE PRACTITIONER

## 2024-05-28 PROCEDURE — G0439 PPPS, SUBSEQ VISIT: HCPCS | Performed by: NURSE PRACTITIONER

## 2024-05-28 PROCEDURE — 80053 COMPREHEN METABOLIC PANEL: CPT | Mod: ZL | Performed by: NURSE PRACTITIONER

## 2024-05-28 PROCEDURE — G0463 HOSPITAL OUTPT CLINIC VISIT: HCPCS

## 2024-05-28 PROCEDURE — 84443 ASSAY THYROID STIM HORMONE: CPT | Mod: ZL | Performed by: NURSE PRACTITIONER

## 2024-05-28 PROCEDURE — 36415 COLL VENOUS BLD VENIPUNCTURE: CPT | Mod: ZL | Performed by: NURSE PRACTITIONER

## 2024-05-28 PROCEDURE — 81001 URINALYSIS AUTO W/SCOPE: CPT | Mod: ZL | Performed by: NURSE PRACTITIONER

## 2024-05-28 SDOH — HEALTH STABILITY: PHYSICAL HEALTH: ON AVERAGE, HOW MANY DAYS PER WEEK DO YOU ENGAGE IN MODERATE TO STRENUOUS EXERCISE (LIKE A BRISK WALK)?: 6 DAYS

## 2024-05-28 ASSESSMENT — SOCIAL DETERMINANTS OF HEALTH (SDOH): HOW OFTEN DO YOU GET TOGETHER WITH FRIENDS OR RELATIVES?: THREE TIMES A WEEK

## 2024-05-28 ASSESSMENT — PAIN SCALES - GENERAL: PAINLEVEL: NO PAIN (0)

## 2024-05-28 NOTE — PROGRESS NOTES
Preventive Care Visit  [facility] MICHELINE  Jo-Ann Little, NABIL, Family Medicine    May 28, 2024          Assessment & Plan       Routine history and physical examination of adult  - UA Macroscopic with reflex to Microscopic and Culture  - CBC with platelets and differential  - TSH with free T4 reflex  - Lipid Profile (Chol, Trig, HDL, LDL calc)  - Comprehensive metabolic panel      Encounter for screening mammogram for malignant neoplasm of breast  - MA Screen Bilateral w/Kevon; Future        Please continue to take all medication as directed  Please notify your pharmacy or our refill line of future refills needed.  Please return sooner than your next scheduled appointment with any concerns.      When your lab results return, we will call you with abnormal findings  You can also view this information in your MyChart, if you have an account.  Please call or [facility] message us with any questions you may have.          Return in about 1 year (around 5/28/2025), or Medicare Physical.        Jo-Ann Fitch -[facility]  [phone]         Therese is a 73 year old, presenting for the following:  Wellness Visit        Health Care Directive  Patient does not have a Health Care Directive or Living Will: Patient states has Advance Directive and will bring in a copy to clinic.          5/28/2024   General Health   How would you rate your overall physical health? Good   Feel stress (tense, anxious, or unable to sleep) Only a little   (!) STRESS CONCERN      5/28/2024   Nutrition   Diet: Regular (no restrictions)         5/28/2024   Exercise   Days per week of moderate/strenous exercise 6 days         5/28/2024   Social Factors   Frequency of gathering with friends or relatives Three times a week   Worry food won't last until get money to buy more No   Food not last or not have enough money for food? No   Do you have housing?  Yes   Are you worried about losing your housing? No   Lack of transportation? No   Unable to get utilities  (heat,electricity)? No         5/28/2024   Fall Risk   Fallen 2 or more times in the past year? No    No   Trouble with walking or balance? No    No          5/28/2024   Activities of Daily Living- Home Safety   Needs help with the following daily activites None of the above   Safety concerns in the home None of the above         5/28/2024   Dental   Dentist two times every year? Yes         5/28/2024   Hearing Screening   Hearing concerns? (!) TROUBLE UNDERSTANDING SOFT OR WHISPERED SPEECH.         5/28/2024   Driving Risk Screening   Patient/family members have concerns about driving No         5/28/2024   General Alertness/Fatigue Screening   Have you been more tired than usual lately? No         5/28/2024   Urinary Incontinence Screening   Bothered by leaking urine in past 6 months No         5/28/2024   TB Screening   Were you born outside of the [place]? No         Today's PHQ-2 Score:       5/28/2024     8:11 AM   PHQ-2 ( 1999 [employer])   Q1: Little interest or pleasure in doing things 0   Q2: Feeling down, depressed or hopeless 0   PHQ-2 Score 0   Q1: Little interest or pleasure in doing things Not at all   Q2: Feeling down, depressed or hopeless Not at all   PHQ-2 Score 0           5/28/2024   Substance Use   Alcohol more than 3/day or more than 7/wk No   Do you have a current opioid prescription? No   How severe/bad is pain from 1 to 10? 0/10 (No Pain)   Do you use any other substances recreationally? No     Social History     Tobacco Use    Smoking status: Never    Smokeless tobacco: Never   Vaping Use    Vaping status: Never Used   Substance Use Topics    Alcohol use: Yes     Comment: occasionally    Drug use: No           6/2/2023   LAST FHS-7 RESULTS   Any relative bilateral breast cancer Yes   Any male have breast cancer No   Any ONE woman have BOTH breast AND ovarian cancer No   Any woman with breast cancer before 50yrs No   2 or more relatives with breast AND/OR ovarian cancer No   2 or more relatives with  breast AND/OR bowel cancer No       Mammogram is due 24, ordered        ASCVD Risk   The 10-year ASCVD risk score (Michelle TELLO, et al., 2019) is: 16.7%    Values used to calculate the score:      Age: 73 years      Sex: Female      Is Non- : No      Diabetic: No      Tobacco smoker: No      Systolic Blood Pressure: 130 mmHg      Is BP treated: No      HDL Cholesterol: 60 mg/dL      Total Cholesterol: 232 mg/dL           Past Medical History:   Diagnosis Date    Backache, unspecified 2001    Lumbago 2001    Need for prophylactic hormone replacement therapy (postmenopausal)     Sciatica 2001     Past Surgical History:   Procedure Laterality Date    COLONOSCOPY      every 5 yrs    conization of cervix      pre-cancer cells    GYN SURGERY      total hyst    hysterectomy/complete, no cervix      large clots     OB History    Para Term  AB Living   3 3 3 0 0 0   SAB IAB Ectopic Multiple Live Births   0 0 0 0 0      # Outcome Date GA Lbr Taras/2nd Weight Sex Type Anes PTL Lv   3 Term            2 Term            1 Term              Lab work is in process  Labs reviewed in EPIC  BP Readings from Last 3 Encounters:   24 130/80   23 130/78   22 130/78    Wt Readings from Last 3 Encounters:   24 64 kg (141 lb 1.5 oz)   23 65.1 kg (143 lb 9.6 oz)   22 66.2 kg (146 lb)                  Patient Active Problem List   Diagnosis    Advanced care planning/counseling discussion     Past Surgical History:   Procedure Laterality Date    COLONOSCOPY      every 5 yrs    conization of cervix      pre-cancer cells    GYN SURGERY      total hyst    hysterectomy/complete, no cervix      large clots       Social History     Tobacco Use    Smoking status: Never    Smokeless tobacco: Never   Substance Use Topics    Alcohol use: Yes     Comment: occasionally     Family History   Problem Relation Age of Onset    Cancer Father         throat     Cerebrovascular Disease Father 73        cause of death    Alcohol/Drug Father     Breast Cancer Sister     Breast Cancer Mother     Hypertension Mother     Thyroid Disease Mother     Cancer Mother     Cancer Son         hodgkins disease    Cancer Sister         lung    Cancer - colorectal Brother 56        cause of death    Alcohol/Drug Brother     Cardiovascular Brother     Cardiovascular Brother     Cancer Brother     Cardiovascular Brother     Cardiovascular Brother     Asthma No family hx of     Diabetes No family hx of          No current outpatient medications on file.     Allergies   Allergen Reactions    Codeine Nausea and Vomiting     Recent Labs   Lab Test 05/22/23  0855 05/03/22  0857 02/15/21  0857 01/27/20  0943 01/05/18  1008   * 142* 142*   < > 156*   HDL 60 60 67   < > 70   TRIG 143 165* 141   < > 143   ALT 23 24 21  --   --    CR 0.97* 0.92 0.90  --  0.87   GFRESTIMATED 61 65 64  --  65   GFRESTBLACK  --   --  74  --  78   POTASSIUM 4.4 4.2 4.1  --  4.2   TSH 1.83 1.67  --   --   --     < > = values in this interval not displayed.          Current providers sharing in care for this patient include:  Patient Care Team:  Jo-Ann Fitch CNP as PCP - General (Family Practice)  Jo-Ann Fitch CNP as Assigned PCP    The following health maintenance items are reviewed in Epic and correct as of today:  Health Maintenance   Topic Date Due    DTAP/TDAP/TD IMMUNIZATION (2 - Td or Tdap) 09/05/2022    COVID-19 Vaccine (6 - 2023-24 season) 09/01/2023    LIPID  05/22/2024    MAMMO SCREENING  06/02/2024    MEDICARE ANNUAL WELLNESS VISIT  05/28/2025    FALL RISK ASSESSMENT  05/28/2025    GLUCOSE  05/22/2026    DEXA  05/10/2027    ADVANCE CARE PLANNING  05/28/2029    COLORECTAL CANCER SCREENING  10/10/2033    HEPATITIS C SCREENING  Completed    PHQ-2 (once per calendar year)  Completed    INFLUENZA VACCINE  Completed    Pneumococcal Vaccine: 65+ Years  Completed    ZOSTER IMMUNIZATION  Completed    RSV VACCINE  "(Pregnancy & 60+)  Completed    IPV IMMUNIZATION  Aged Out    HPV IMMUNIZATION  Aged Out    MENINGITIS IMMUNIZATION  Aged Out    RSV MONOCLONAL ANTIBODY  Aged Out           Review of Systems  Constitutional, HEENT, cardiovascular, pulmonary, GI, , musculoskeletal, neuro, skin, endocrine and psych systems are negative, except as otherwise noted.         Objective    Exam  /80 (BP Location: Left arm, Patient Position: Sitting, Cuff Size: Adult Regular)   Pulse 67   Temp 98.1  F (36.7  C) (Tympanic)   Resp 18   Ht 1.623 m (5' 3.9\")   Wt 64 kg (141 lb 1.5 oz)   SpO2 98%   BMI 24.29 kg/m     Estimated body mass index is 24.29 kg/m  as calculated from the following:    Height as of this encounter: 1.623 m (5' 3.9\").    Weight as of this encounter: 64 kg (141 lb 1.5 oz).      Physical Exam  GENERAL: alert and no distress  EYES: Eyes grossly normal to inspection, PERRL and conjunctivae and sclerae normal  HENT: ear canals and TM's normal, nose and mouth without ulcers or lesions  NECK: no adenopathy, no asymmetry, masses, or scars  RESP: lungs clear to auscultation - no rales, rhonchi or wheezes  CV: regular rate and rhythm, normal S1 S2, no S3 or S4, no murmur, click or rub, no peripheral edema  ABDOMEN: soft, nontender, no hepatosplenomegaly, no masses and bowel sounds normal  MS: no gross musculoskeletal defects noted, no edema  SKIN: no suspicious lesions or rashes  PSYCH: mentation appears normal, affect normal/bright         5/28/2024   Mini Cog   Clock Draw Score 2 Normal   3 Item Recall 3 objects recalled   Mini Cog Total Score 5            Signed Electronically by: Jo-Ann Fitch CNP    "

## 2024-05-28 NOTE — RESULT ENCOUNTER NOTE
High lipids - Risk score is 16.7% - goal is below 10%  Recommend low dose Lipitor, or non-statin Zetia  Avoid fatty  / fried foods      Renal function a bit lower than prior - insure adequate hydration.  Should recheck this in 3 months or so - I will enter order, she will need a lab only appt    Other labs are all good.     The 10-year ASCVD risk score (Michelle TELLO, et al., 2019) is: 16.7%    Values used to calculate the score:      Age: 73 years      Sex: Female      Is Non- : No      Diabetic: No      Tobacco smoker: No      Systolic Blood Pressure: 130 mmHg      Is BP treated: No      HDL Cholesterol: 61 mg/dL      Total Cholesterol: 231 mg/dL

## 2024-05-28 NOTE — PATIENT INSTRUCTIONS
"  Assessment & Plan       Routine history and physical examination of adult  - UA Macroscopic with reflex to Microscopic and Culture  - CBC with platelets and differential  - TSH with free T4 reflex  - Lipid Profile (Chol, Trig, HDL, LDL calc)  - Comprehensive metabolic panel      Encounter for screening mammogram for malignant neoplasm of breast  - MA Screen Bilateral w/Kevon; Future        Please continue to take all medication as directed  Please notify your pharmacy or our refill line of future refills needed.  Please return sooner than your next scheduled appointment with any concerns.      When your lab results return, we will call you with abnormal findings  You can also view this information in your MyChart, if you have an account.  Please call or [employer] message us with any questions you may have.          Return in about 1 year (around 5/28/2025), or Medicare Physical.      [facility]  [phone]           Preventive Care Advice   This is general advice we often give to help people stay healthy. Your care team may have specific advice just for you. Please talk to your care team about your own preventive care needs.  Lifestyle  Exercise at least 150 minutes each week (30 minutes a day, 5 days a week).  Do muscle strengthening activities 2 days a week. These help control your weight and prevent disease.  No smoking.  Wear sunscreen to prevent skin cancer.  Have your home tested for radon every 2 to 5 years. Radon is a colorless, odorless gas that can harm your lungs. To learn more, go to www.health.[facility].[place].us and search for \"Radon in Homes.\"  Keep guns unloaded and locked up in a safe place like a safe or gun vault, or, use a gun lock and hide the keys. Always lock away bullets separately. To learn more, visit [employer].[place].gov and search for \"safe gun storage.\"  Nutrition  Eat 5 or more servings of fruits and vegetables each day.  Try wheat bread, brown rice and whole grain pasta (instead of white bread, " rice, and pasta).  Get enough calcium and vitamin D. Check the label on foods and aim for 100% of the RDA (recommended daily allowance).  Regular exams  Have a dental exam and cleaning every 6 months.  See your health care team every year to talk about:  Any changes in your health.  Any medicines your care team has prescribed.  Preventive care, family planning, and ways to prevent chronic diseases.  Shots (vaccines)   HPV shots (up to age 26), if you've never had them before.  Hepatitis B shots (up to age 59), if you've never had them before.  COVID-19 shot: Get this shot when it's due.  Flu shot: Get a flu shot every year.  Tetanus shot: Get a tetanus shot every 10 years.  Pneumococcal, hepatitis A, and RSV shots: Ask your care team if you need these based on your risk.  Shingles shot (for age 50 and up).  General health tests  Diabetes screening:  Starting at age 35, Get screened for diabetes at least every 3 years.  If you are younger than age 35, ask your care team if you should be screened for diabetes.  Cholesterol test: At age 39, start having a cholesterol test every 5 years, or more often if advised.  Bone density scan (DEXA): At age 50, ask your care team if you should have this scan for osteoporosis (brittle bones).  Hepatitis C: Get tested at least once in your life.  Abdominal aortic aneurysm screening: Talk to your doctor about having this screening if you:  Have ever smoked; and  Are biologically male; and  Are between the ages of 65 and 75.  STIs (sexually transmitted infections)  Before age 24: Ask your care team if you should be screened for STIs.  After age 24: Get screened for STIs if you're at risk. You are at risk for STIs (including HIV) if:  You are sexually active with more than one person.  You don't use condoms every time.  You or a partner was diagnosed with a sexually transmitted infection.  If you are at risk for HIV, ask about PrEP medicine to prevent HIV.  Get tested for HIV at least  once in your life, whether you are at risk for HIV or not.  Cancer screening tests  Cervical cancer screening: If you have a cervix, begin getting regular cervical cancer screening tests at age 21. Most people who have regular screenings with normal results can stop after age 65. Talk about this with your provider.  Breast cancer scan (mammogram): If you've ever had breasts, begin having regular mammograms starting at age 40. This is a scan to check for breast cancer.  Colon cancer screening: It is important to start screening for colon cancer at age 45.  Have a colonoscopy test every 10 years (or more often if you're at risk) Or, ask your provider about stool tests like a FIT test every year or Cologuard test every 3 years.  To learn more about your testing options, visit: www.[employer]895311.pdf.  For help making a decision, visit: trudi/ls68696.  Prostate cancer screening test: If you have a prostate and are age 55 to 69, ask your provider if you would benefit from a yearly prostate cancer screening test.  Lung cancer screening: If you are a current or former smoker age 50 to 80, ask your care team if ongoing lung cancer screenings are right for you.  For informational purposes only. Not to replace the advice of your health care provider. Copyright   2023 [facility]. All rights reserved. Clinically reviewed by the [facility]. [employer] 640546 - REV 04/24.    Hearing Loss: Care Instructions  Overview     Hearing loss is a sudden or slow decrease in how well you hear. It can range from slight to profound. Permanent hearing loss can occur with aging. It also can happen when you are exposed long-term to loud noise. Examples include listening to loud music, riding motorcycles, or being around other loud machines.  Hearing loss can affect your work and home life. It can make you feel lonely or depressed. You may feel that you have lost your independence. But hearing aids  and other devices can help you hear better and feel connected to others.  Follow-up care is a key part of your treatment and safety. Be sure to make and go to all appointments, and call your doctor if you are having problems. It's also a good idea to know your test results and keep a list of the medicines you take.  How can you care for yourself at home?  Avoid loud noises whenever possible. This helps keep your hearing from getting worse.  Always wear hearing protection around loud noises.  Wear a hearing aid as directed.  A professional can help you pick a hearing aid that will work best for you.  You can also get hearing aids over the counter for mild to moderate hearing loss.  Have hearing tests as your doctor suggests. They can show whether your hearing has changed. Your hearing aid may need to be adjusted.  Use other devices as needed. These may include:  Telephone amplifiers and hearing aids that can connect to a television, stereo, radio, or microphone.  Devices that use lights or vibrations. These alert you to the doorbell, a ringing telephone, or a baby monitor.  Television closed-captioning. This shows the words at the bottom of the screen. Most new TVs can do this.  TTY (text telephone). This lets you type messages back and forth on the telephone instead of talking or listening. These devices are also called TDD. When messages are typed on the keyboard, they are sent over the phone line to a receiving TTY. The message is shown on a monitor.  Use text messaging, social media, and email if it is hard for you to communicate by telephone.  Try to learn a listening technique called speechreading. It is not lipreading. You pay attention to people's gestures, expressions, posture, and tone of voice. These clues can help you understand what a person is saying. Face the person you are talking to, and have them face you. Make sure the lighting is good. You need to see the other person's face clearly.  Think about  "counseling if you need help to adjust to your hearing loss.  When should you call for help?  Watch closely for changes in your health, and be sure to contact your doctor if:    You think your hearing is getting worse.     You have new symptoms, such as dizziness or nausea.   Where can you learn more?  Go to https://www.[employer].net/patiented  Enter R798 in the search box to learn more about \"Hearing Loss: Care Instructions.\"  Current as of: September 27, 2023               Content Version: 14.0    4912-0492 [employer].   Care instructions adapted under license by your healthcare professional. If you have questions about a medical condition or this instruction, always ask your healthcare professional. [employer] disclaims any warranty or liability for your use of this information.      Learning About Stress  What is stress?     Stress is your body's response to a hard situation. Your body can have a physical, emotional, or mental response. Stress is a fact of life for most people, and it affects everyone differently. What causes stress for you may not be stressful for someone else.  A lot of things can cause stress. You may feel stress when you go on a job interview, take a test, or run a race. This kind of short-term stress is normal and even useful. It can help you if you need to work hard or react quickly. For example, stress can help you finish an important job on time.  Long-term stress is caused by ongoing stressful situations or events. Examples of long-term stress include long-term health problems, ongoing problems at work, or conflicts in your family. Long-term stress can harm your health.  How does stress affect your health?  When you are stressed, your body responds as though you are in danger. It makes hormones that speed up your heart, make you breathe faster, and give you a burst of energy. This is called the fight-or-flight stress response. If the stress is over quickly, " your body goes back to normal and no harm is done.  But if stress happens too often or lasts too long, it can have bad effects. Long-term stress can make you more likely to get sick, and it can make symptoms of some diseases worse. If you tense up when you are stressed, you may develop neck, shoulder, or low back pain. Stress is linked to high blood pressure and heart disease.  Stress also harms your emotional health. It can make you [place], tense, or depressed. Your relationships may suffer, and you may not do well at work or school.  What can you do to manage stress?  You can try these things to help manage stress:   Do something active. Exercise or activity can help reduce stress. Walking is a great way to get started. Even everyday activities such as housecleaning or yard work can help.  Try yoga or daria chi. These techniques combine exercise and meditation. You may need some training at first to learn them.  Do something you enjoy. For example, listen to music or go to a movie. Practice your hobby or do volunteer work.  Meditate. This can help you relax, because you are not worrying about what happened before or what may happen in the future.  Do guided imagery. Imagine yourself in any setting that helps you feel calm. You can use online videos, books, or a teacher to guide you.  Do breathing exercises. For example:  From a standing position, bend forward from the waist with your knees slightly bent. Let your arms dangle close to the floor.  Breathe in slowly and deeply as you return to a standing position. Roll up slowly and lift your head last.  Hold your breath for just a few seconds in the standing position.  Breathe out slowly and bend forward from the waist.  Let your feelings out. Talk, laugh, cry, and express anger when you need to. Talking with supportive friends or family, a counselor, or a larry leader about your feelings is a healthy way to relieve stress. Avoid discussing your feelings with people  "who make you feel worse.  Write. It may help to write about things that are bothering you. This helps you find out how much stress you feel and what is causing it. When you know this, you can find better ways to cope.  What can you do to prevent stress?  You might try some of these things to help prevent stress:  Manage your time. This helps you find time to do the things you want and need to do.  Get enough sleep. Your body recovers from the stresses of the day while you are sleeping.  Get support. Your family, friends, and community can make a difference in how you experience stress.  Limit your news feed. Avoid or limit time on social media or news that may make you feel stressed.  Do something active. Exercise or activity can help reduce stress. Walking is a great way to get started.  Where can you learn more?  Go to https://www.[employer].net/patiented  Enter N032 in the search box to learn more about \"Learning About Stress.\"  Current as of: October 24, 2023               Content Version: 14.0    2522-4943 [employer].   Care instructions adapted under license by your healthcare professional. If you have questions about a medical condition or this instruction, always ask your healthcare professional. [employer] disclaims any warranty or liability for your use of this information.      "

## 2024-07-01 ENCOUNTER — ANCILLARY PROCEDURE (OUTPATIENT)
Dept: MAMMOGRAPHY | Facility: OTHER | Age: 74
End: 2024-07-01
Attending: NURSE PRACTITIONER
Payer: MEDICARE

## 2024-07-01 ENCOUNTER — TELEPHONE (OUTPATIENT)
Dept: MAMMOGRAPHY | Facility: HOSPITAL | Age: 74
End: 2024-07-01

## 2024-07-01 DIAGNOSIS — Z12.31 ENCOUNTER FOR SCREENING MAMMOGRAM FOR MALIGNANT NEOPLASM OF BREAST: ICD-10-CM

## 2024-07-01 PROCEDURE — 77063 BREAST TOMOSYNTHESIS BI: CPT | Mod: TC

## 2025-05-30 ENCOUNTER — RESULTS FOLLOW-UP (OUTPATIENT)
Dept: FAMILY MEDICINE | Facility: OTHER | Age: 75
End: 2025-05-30

## 2025-05-30 NOTE — RESULT ENCOUNTER NOTE
All labs are stable  Cardiac risk score continues elevated due to lipid profile  Continue to wok on low saturated fat diet      The 10-year ASCVD risk score (Michelle TELLO, et al., 2019) is: 18%    Values used to calculate the score:      Age: 74 years      Sex: Female      Is Non- : No      Diabetic: No      Tobacco smoker: No      Systolic Blood Pressure: 128 mmHg      Is BP treated: No      HDL Cholesterol: 62 mg/dL      Total Cholesterol: 217 mg/dL

## 2025-07-03 ENCOUNTER — PATIENT OUTREACH (OUTPATIENT)
Dept: CARE COORDINATION | Facility: CLINIC | Age: 75
End: 2025-07-03

## 2025-07-23 ENCOUNTER — TELEPHONE (OUTPATIENT)
Dept: MAMMOGRAPHY | Facility: HOSPITAL | Age: 75
End: 2025-07-23

## 2025-07-23 ENCOUNTER — ANCILLARY PROCEDURE (OUTPATIENT)
Dept: MAMMOGRAPHY | Facility: OTHER | Age: 75
End: 2025-07-23
Attending: NURSE PRACTITIONER
Payer: MEDICARE

## 2025-07-23 DIAGNOSIS — Z12.31 ENCOUNTER FOR SCREENING MAMMOGRAM FOR MALIGNANT NEOPLASM OF BREAST: ICD-10-CM

## 2025-07-23 PROCEDURE — 77063 BREAST TOMOSYNTHESIS BI: CPT | Mod: TC

## 2025-07-23 PROCEDURE — 77063 BREAST TOMOSYNTHESIS BI: CPT | Mod: 26 | Performed by: RADIOLOGY

## 2025-07-23 PROCEDURE — 77067 SCR MAMMO BI INCL CAD: CPT | Mod: 26 | Performed by: RADIOLOGY
